# Patient Record
Sex: FEMALE | Race: WHITE | NOT HISPANIC OR LATINO | Employment: OTHER | URBAN - METROPOLITAN AREA
[De-identification: names, ages, dates, MRNs, and addresses within clinical notes are randomized per-mention and may not be internally consistent; named-entity substitution may affect disease eponyms.]

---

## 2023-01-09 ENCOUNTER — TRANSFERRED RECORDS (OUTPATIENT)
Dept: MULTI SPECIALTY CLINIC | Facility: CLINIC | Age: 83
End: 2023-01-09

## 2023-01-09 LAB
CREATININE (EXTERNAL): 0.75 MG/DL (ref 0.55–1.02)
GFR ESTIMATED (EXTERNAL): >60 ML/MIN/1.73M2
GLUCOSE (EXTERNAL): 88 MG/DL (ref 70–99)
POTASSIUM (EXTERNAL): 4.4 MMOL/L (ref 3.5–5.1)

## 2023-01-09 RX ORDER — GEMFIBROZIL 600 MG/1
600 TABLET, FILM COATED ORAL
COMMUNITY

## 2023-01-09 RX ORDER — DOCUSATE SODIUM 100 MG/1
100 CAPSULE, LIQUID FILLED ORAL 2 TIMES DAILY
COMMUNITY

## 2023-01-09 RX ORDER — ALBUTEROL SULFATE 90 UG/1
2 AEROSOL, METERED RESPIRATORY (INHALATION) EVERY 6 HOURS PRN
COMMUNITY

## 2023-01-09 RX ORDER — NITROGLYCERIN 0.4 MG/1
0.4 TABLET SUBLINGUAL EVERY 5 MIN PRN
COMMUNITY

## 2023-01-09 RX ORDER — SPIRONOLACTONE 25 MG/1
25 TABLET ORAL EVERY MORNING
COMMUNITY

## 2023-01-09 RX ORDER — CHLORAL HYDRATE 500 MG
2 CAPSULE ORAL EVERY EVENING
COMMUNITY

## 2023-01-09 RX ORDER — CHOLECALCIFEROL (VITAMIN D3) 50 MCG
1 TABLET ORAL EVERY MORNING
COMMUNITY

## 2023-01-09 RX ORDER — ATORVASTATIN CALCIUM 40 MG/1
40 TABLET, FILM COATED ORAL EVERY EVENING
COMMUNITY

## 2023-01-09 RX ORDER — LOSARTAN POTASSIUM 25 MG/1
25 TABLET ORAL EVERY EVENING
COMMUNITY

## 2023-01-09 RX ORDER — SODIUM PHOSPHATE,MONO-DIBASIC 19G-7G/118
1 ENEMA (ML) RECTAL EVERY MORNING
COMMUNITY

## 2023-01-09 RX ORDER — LANOLIN ALCOHOL/MO/W.PET/CERES
1000 CREAM (GRAM) TOPICAL EVERY MORNING
COMMUNITY

## 2023-01-09 RX ORDER — FLUTICASONE PROPIONATE 110 UG/1
1 AEROSOL, METERED RESPIRATORY (INHALATION) PRN
COMMUNITY

## 2023-01-09 RX ORDER — MULTIVIT-MIN/IRON/FOLIC ACID/K 18-600-40
500 CAPSULE ORAL EVERY EVENING
COMMUNITY

## 2023-01-09 RX ORDER — METOPROLOL TARTRATE 50 MG
50 TABLET ORAL EVERY EVENING
COMMUNITY

## 2023-01-09 RX ORDER — EZETIMIBE 10 MG/1
10 TABLET ORAL EVERY EVENING
COMMUNITY

## 2023-01-09 RX ORDER — LEVOTHYROXINE SODIUM 125 UG/1
125 TABLET ORAL EVERY MORNING
COMMUNITY

## 2023-01-22 ENCOUNTER — ANESTHESIA EVENT (OUTPATIENT)
Dept: SURGERY | Facility: CLINIC | Age: 83
End: 2023-01-22
Payer: COMMERCIAL

## 2023-01-23 ENCOUNTER — ANESTHESIA (OUTPATIENT)
Dept: SURGERY | Facility: CLINIC | Age: 83
End: 2023-01-23
Payer: COMMERCIAL

## 2023-01-23 ENCOUNTER — HOSPITAL ENCOUNTER (OUTPATIENT)
Facility: CLINIC | Age: 83
Discharge: HOME | End: 2023-01-23
Attending: INTERNAL MEDICINE | Admitting: INTERNAL MEDICINE
Payer: COMMERCIAL

## 2023-01-23 VITALS
WEIGHT: 187 LBS | OXYGEN SATURATION: 99 % | RESPIRATION RATE: 12 BRPM | HEIGHT: 64 IN | DIASTOLIC BLOOD PRESSURE: 56 MMHG | BODY MASS INDEX: 31.92 KG/M2 | TEMPERATURE: 96.8 F | SYSTOLIC BLOOD PRESSURE: 100 MMHG | HEART RATE: 66 BPM

## 2023-01-23 LAB — COLONOSCOPY: NORMAL

## 2023-01-23 PROCEDURE — 360N000075 HC SURGERY LEVEL 2, PER MIN: Performed by: INTERNAL MEDICINE

## 2023-01-23 PROCEDURE — 250N000009 HC RX 250: Performed by: ANESTHESIOLOGY

## 2023-01-23 PROCEDURE — 272N000001 HC OR GENERAL SUPPLY STERILE: Performed by: INTERNAL MEDICINE

## 2023-01-23 PROCEDURE — 370N000017 HC ANESTHESIA TECHNICAL FEE, PER MIN: Performed by: INTERNAL MEDICINE

## 2023-01-23 PROCEDURE — 710N000012 HC RECOVERY PHASE 2, PER MINUTE: Performed by: INTERNAL MEDICINE

## 2023-01-23 PROCEDURE — 250N000011 HC RX IP 250 OP 636: Performed by: NURSE ANESTHETIST, CERTIFIED REGISTERED

## 2023-01-23 PROCEDURE — 999N000141 HC STATISTIC PRE-PROCEDURE NURSING ASSESSMENT: Performed by: INTERNAL MEDICINE

## 2023-01-23 PROCEDURE — 250N000011 HC RX IP 250 OP 636: Performed by: INTERNAL MEDICINE

## 2023-01-23 PROCEDURE — 258N000003 HC RX IP 258 OP 636: Performed by: ANESTHESIOLOGY

## 2023-01-23 PROCEDURE — 88305 TISSUE EXAM BY PATHOLOGIST: CPT | Mod: TC | Performed by: INTERNAL MEDICINE

## 2023-01-23 RX ORDER — NALOXONE HYDROCHLORIDE 0.4 MG/ML
0.2 INJECTION, SOLUTION INTRAMUSCULAR; INTRAVENOUS; SUBCUTANEOUS
Status: CANCELLED | OUTPATIENT
Start: 2023-01-23

## 2023-01-23 RX ORDER — PROPOFOL 10 MG/ML
INJECTION, EMULSION INTRAVENOUS CONTINUOUS PRN
Status: DISCONTINUED | OUTPATIENT
Start: 2023-01-23 | End: 2023-01-23

## 2023-01-23 RX ORDER — ONDANSETRON 2 MG/ML
4 INJECTION INTRAMUSCULAR; INTRAVENOUS EVERY 6 HOURS PRN
Status: CANCELLED | OUTPATIENT
Start: 2023-01-23

## 2023-01-23 RX ORDER — ONDANSETRON 2 MG/ML
4 INJECTION INTRAMUSCULAR; INTRAVENOUS
Status: COMPLETED | OUTPATIENT
Start: 2023-01-23 | End: 2023-01-23

## 2023-01-23 RX ORDER — PROCHLORPERAZINE MALEATE 5 MG
5 TABLET ORAL EVERY 6 HOURS PRN
Status: CANCELLED | OUTPATIENT
Start: 2023-01-23

## 2023-01-23 RX ORDER — NALOXONE HYDROCHLORIDE 0.4 MG/ML
0.4 INJECTION, SOLUTION INTRAMUSCULAR; INTRAVENOUS; SUBCUTANEOUS
Status: CANCELLED | OUTPATIENT
Start: 2023-01-23

## 2023-01-23 RX ORDER — LIDOCAINE 40 MG/G
CREAM TOPICAL
Status: DISCONTINUED | OUTPATIENT
Start: 2023-01-23 | End: 2023-01-23 | Stop reason: HOSPADM

## 2023-01-23 RX ORDER — SODIUM CHLORIDE, SODIUM LACTATE, POTASSIUM CHLORIDE, CALCIUM CHLORIDE 600; 310; 30; 20 MG/100ML; MG/100ML; MG/100ML; MG/100ML
INJECTION, SOLUTION INTRAVENOUS CONTINUOUS
Status: DISCONTINUED | OUTPATIENT
Start: 2023-01-23 | End: 2023-01-23 | Stop reason: HOSPADM

## 2023-01-23 RX ORDER — FLUMAZENIL 0.1 MG/ML
0.2 INJECTION, SOLUTION INTRAVENOUS
Status: CANCELLED | OUTPATIENT
Start: 2023-01-23 | End: 2023-01-23

## 2023-01-23 RX ORDER — ONDANSETRON 4 MG/1
4 TABLET, ORALLY DISINTEGRATING ORAL EVERY 6 HOURS PRN
Status: CANCELLED | OUTPATIENT
Start: 2023-01-23

## 2023-01-23 RX ADMIN — PROPOFOL 150 MCG/KG/MIN: 10 INJECTION, EMULSION INTRAVENOUS at 09:05

## 2023-01-23 RX ADMIN — ONDANSETRON 4 MG: 2 INJECTION INTRAMUSCULAR; INTRAVENOUS at 09:10

## 2023-01-23 RX ADMIN — PROPOFOL 85 MG: 10 INJECTION, EMULSION INTRAVENOUS at 09:13

## 2023-01-23 RX ADMIN — LIDOCAINE HYDROCHLORIDE 20 MG: 10 INJECTION, SOLUTION INFILTRATION; PERINEURAL at 09:01

## 2023-01-23 RX ADMIN — PROPOFOL 20 MG: 10 INJECTION, EMULSION INTRAVENOUS at 09:04

## 2023-01-23 RX ADMIN — SODIUM CHLORIDE, POTASSIUM CHLORIDE, SODIUM LACTATE AND CALCIUM CHLORIDE: 600; 310; 30; 20 INJECTION, SOLUTION INTRAVENOUS at 08:50

## 2023-01-23 ASSESSMENT — ACTIVITIES OF DAILY LIVING (ADL): ADLS_ACUITY_SCORE: 35

## 2023-01-23 NOTE — ANESTHESIA POSTPROCEDURE EVALUATION
Patient: Lindsay Ford    Procedure: Procedure(s):  COLONOSCOPY with polypectomies       Anesthesia Type:  MAC    Note:     Postop Pain Control: Uneventful            Sign Out: Well controlled pain   PONV: No   Neuro/Psych: Uneventful            Sign Out: Acceptable/Baseline neuro status   Airway/Respiratory: Uneventful            Sign Out: Acceptable/Baseline resp. status   CV/Hemodynamics: Uneventful            Sign Out: Acceptable CV status; No obvious hypovolemia; No obvious fluid overload   Other NRE: NONE   DID A NON-ROUTINE EVENT OCCUR? No           Last vitals:  Vitals Value Taken Time   /56 01/23/23 0957   Temp 36  C (96.8  F) 01/23/23 0957   Pulse 66 01/23/23 0934   Resp 12 01/23/23 0957   SpO2 99 % 01/23/23 0957       Electronically Signed By: Pacheco Mclean MD  January 23, 2023  11:16 AM

## 2023-01-23 NOTE — INTERVAL H&P NOTE
"I have reviewed the surgical (or preoperative) H&P that is linked to this encounter, and examined the patient. There are no significant changes    Clinical Conditions Present on Arrival:  Clinically Significant Risk Factors Present on Admission                    # Obesity: Estimated body mass index is 32.61 kg/m  as calculated from the following:    Height as of this encounter: 1.613 m (5' 3.5\").    Weight as of this encounter: 84.8 kg (187 lb).       "

## 2023-01-23 NOTE — ANESTHESIA CARE TRANSFER NOTE
Patient: Lindsay Ford    Procedure: Procedure(s):  COLONOSCOPY with polypectomies       Diagnosis: Screening for colon cancer [Z12.11]  Diagnosis Additional Information: No value filed.    Anesthesia Type:   MAC     Note:    Oropharynx: oropharynx clear of all foreign objects and spontaneously breathing  Level of Consciousness: awake  Oxygen Supplementation: room air    Independent Airway: airway patency satisfactory and stable    Vital Signs Stable: post-procedure vital signs reviewed and stable  Report to RN Given: handoff report given  Patient transferred to: Phase II    Handoff Report: Identifed the Patient, Identified the Reponsible Provider, Reviewed the pertinent medical history, Discussed the surgical course, Reviewed Intra-OP anesthesia mangement and issues during anesthesia, Set expectations for post-procedure period and Allowed opportunity for questions and acknowledgement of understanding      Vitals:  Vitals Value Taken Time   BP 93/56 01/23/23 0934   Temp 36  C (96.8  F) 01/23/23 0934   Pulse 66 01/23/23 0934   Resp 14 01/23/23 0934   SpO2 99 % 01/23/23 0934       Electronically Signed By: DEVON Duarte CRNA  January 23, 2023  9:38 AM

## 2023-01-23 NOTE — ANESTHESIA PREPROCEDURE EVALUATION
Anesthesia Pre-Procedure Evaluation    Patient: Lindsay Ford   MRN: [id number] : 1940        Procedure : Procedure(s):  COLONOSCOPY          Past Medical History:   Diagnosis Date     Complication of anesthesia      PONV (postoperative nausea and vomiting)       Past Surgical History:   Procedure Laterality Date     BRAIN SURGERY       HYSTERECTOMY       SINUS SURGERY        No Known Allergies   Social History     Tobacco Use     Smoking status: Former     Types: Cigarettes     Quit date:      Years since quittin.0     Smokeless tobacco: Never   Substance Use Topics     Alcohol use: Yes     Comment: Very rare      Wt Readings from Last 1 Encounters:   23 84.8 kg (187 lb)        Anesthesia Evaluation   Pt has had prior anesthetic.     History of anesthetic complications  - PONV.      ROS/MED HX  ENT/Pulmonary:  - neg pulmonary ROS     Neurologic: Comment: Hx of crani      Cardiovascular:  - neg cardiovascular ROS     METS/Exercise Tolerance:     Hematologic:  - neg hematologic  ROS     Musculoskeletal:       GI/Hepatic:  - neg GI/hepatic ROS     Renal/Genitourinary:  - neg Renal ROS     Endo:  - neg endo ROS     Psychiatric/Substance Use:       Infectious Disease:       Malignancy:       Other:            Physical Exam    Airway        Mallampati: II   TM distance: > 3 FB   Neck ROM: full   Mouth opening: > 3 cm    Respiratory Devices and Support         Dental       (+) Minor Abnormalities - some fillings, tiny chips      Cardiovascular   cardiovascular exam normal          Pulmonary   pulmonary exam normal                OUTSIDE LABS:  CBC: No results found for: WBC, HGB, HCT, PLT  BMP: No results found for: NA, POTASSIUM, CHLORIDE, CO2, BUN, CR, GLC  COAGS: No results found for: PTT, INR, FIBR  POC: No results found for: BGM, HCG, HCGS  HEPATIC: No results found for: ALBUMIN, PROTTOTAL, ALT, AST, GGT, ALKPHOS, BILITOTAL, BILIDIRECT, STACEY  OTHER: No results found for: PH, LACT, A1C, BEBA,  PHOS, MAG, LIPASE, AMYLASE, TSH, T4, T3, CRP, SED    Anesthesia Plan    ASA Status:  2   NPO Status:  NPO Appropriate    Anesthesia Type: MAC.              Consents    Anesthesia Plan(s) and associated risks, benefits, and realistic alternatives discussed. Questions answered and patient/representative(s) expressed understanding.     - Discussed: Risks, Benefits and Alternatives for the PROCEDURE were discussed     - Discussed with:  Patient         Postoperative Care    Pain management: IV analgesics, Oral pain medications.   PONV prophylaxis: Ondansetron (or other 5HT-3), Dexamethasone or Solumedrol     Comments:                Pacheco Mclean MD

## 2023-01-23 NOTE — CONSULTS
Pre-procedure Note    Reason for procedure: Adenoma surveillance    History and Physical Reviewed: Reviewed, no changes.    Pre-sedation assessment:    General: alert, appears stated age, and cooperative  Airway: normal  Heart: regular rate and rhythm  Lungs: clear to auscultation bilaterally    Sedation Plan based on assessment: MAC    Mallampati score: Class II (visualization of the soft palate, fauces, and uvula)          ASA Classification: ASA 2 - Patient with mild systemic disease with no functional limitations    Impression: Patient deemed adequate candidate for MAC sedation    Risks, benefits and alternatives were discussed with the patient and informed consent was obtained.    Plan: colonoscopy      Edward Vazquez MD 1/23/2023 8:56 AM                                               Edward Vazquez MD  Thank you for the opportunity to participate in the care of this patient.   Please feel free to call me with any questions or concerns.  Phone number [phone].

## 2023-01-24 LAB
PATH REPORT.COMMENTS IMP SPEC: NORMAL
PATH REPORT.COMMENTS IMP SPEC: NORMAL
PATH REPORT.FINAL DX SPEC: NORMAL
PATH REPORT.GROSS SPEC: NORMAL
PATH REPORT.MICROSCOPIC SPEC OTHER STN: NORMAL
PATH REPORT.RELEVANT HX SPEC: NORMAL
PHOTO IMAGE: NORMAL

## 2023-01-24 PROCEDURE — 88305 TISSUE EXAM BY PATHOLOGIST: CPT | Mod: 26 | Performed by: PATHOLOGY

## 2023-09-11 ENCOUNTER — TRANSCRIBE ORDERS (OUTPATIENT)
Dept: OTHER | Age: 83
End: 2023-09-11

## 2023-09-11 ENCOUNTER — PATIENT OUTREACH (OUTPATIENT)
Dept: ONCOLOGY | Facility: CLINIC | Age: 83
End: 2023-09-11
Payer: COMMERCIAL

## 2023-09-11 DIAGNOSIS — N83.202 BILATERAL OVARIAN CYSTS: Primary | ICD-10-CM

## 2023-09-11 DIAGNOSIS — N83.201 BILATERAL OVARIAN CYSTS: Primary | ICD-10-CM

## 2023-09-11 NOTE — PROGRESS NOTES
"New Patient Hematology / Oncology Nurse Navigator Note     Referral Date: 9/11/23    Referring provider:   Humaira Plascencia MD   [address] [place] [address]   [phone] (Work)   [phone] (Fax)     Referring Clinic/Organization: [facility]      Referred to: GynOnc    Requested provider (if applicable): First available - did not specify     Evaluation for :        Clinical History (per Nurse review of records provided):     Note from referring MD re: surgery:  \"After review of ultrasound again and speaking with patient, reviewed recommendation for GYN oncology to perform surgery given possibility for staging needed, even if low. A referral was placed to [facility], but they cannot accommodate her for many months, so referral to the [facility] GYN oncology clinic placed. She states she is concerned and desires sooner surgery. She is on my schedule for October 10th and we will leave this in place until we hear back from the [facility].    Reviewed the rationale to have GYN oncology do her surgery, so they can perform stating if possible. She states she would like surgery as soon as possible. She did call the [facility] and they needed her records and then will review and schedule appointment. \"-- BOOKMARKED  8/28/23 Pelvic US:  Impression:   Uterus surgically absent.   The right ovary contains several anechoic areas, with a new papillary   projection noted.  No color flow noted.   The left ovary now contains several anechoic areas with now three   papillary projections noted (two within the larger anechoic cyst).   These cysts have changed since her last ultrasound.   No free fluid noted.  -- BOOKMARKED  8/28 labs/tumor markers -- BOOKMARKED    Clinical Assessment / Barriers to Care (Per Nurse):  Pt lives in [place], willing to go to any location. Currently scheduled for surgery with OBGYN at  10/2 but they're hoping for her to meet with GynOnc for consult. Unable to accommodate at  in a " timely fashion.       Records Location: Care Everywhere     Records Needed:   Records from  per protocol. Please obtain op/path report from Hysterectomy. Unsure where/when this was done please contact patient to find out where she had surgery and obtain records.     Additional testing needed prior to consult:   N/A    Referral updates and Plan:   OUTGOING CALL to pt:  Introduced my role as nurse navigator [facility] and that we have recd the referral to GynOnc from OBGYN team at .   Pt confirms they are aware of the referral and ready to schedule. Discussed consult with Dr. Barrios at [facility] next week which pt is agreeable to.     Message to referring MD with update on referral/date of consult.   Provided contact information if future questions arise.     -Transferred pt to NPS line [phone] to schedule appt per scheduling instructions.    Rosita Blakely, BSN, RN, PHN, OCN  Hematology/Oncology Nurse Navigator  [facility]  [phone]

## 2023-09-13 NOTE — TELEPHONE ENCOUNTER
RECORDS STATUS - ALL OTHER DIAGNOSIS    Referring Provider/Location:  [facility]  Dx and Code: Bilateral ovarian cysts [N83.201, N83.202]  - Primary  Appt Date:  9.18.23  Provider: Denis    Records Requested     September 13, 2023 8:25 AM    TJ   Clinic name Comments/Action Taken Outcome   [facility] Faxed request for all images  Complete - resolved in PACS     RECORDS RECEIVED FROM: [facility]   DATE RECEIVED: CE   NOTES STATUS DETAILS   [facility]11.23  [facility]   OPERATIVE REPORT 1.23.23    MEDICATION LIST Care Everywhere    LABS     PATHOLOGY REPORTS None related    ANYTHING RELATED TO DIAGNOSIS 8.28.23 Labs   IMAGING (NEED IMAGES & REPORT)     MRI 6.27.23; 5.25.22 Brain   XRAYS 7.26.23 Dexa/Bone Density   ULTRASOUND 8.28.23; 6.23.23; 7.18.22; 7.21.21 Pelvic/Gyn

## 2023-09-18 ENCOUNTER — PRE VISIT (OUTPATIENT)
Dept: ONCOLOGY | Facility: HOSPITAL | Age: 83
End: 2023-09-18
Payer: COMMERCIAL

## 2023-09-18 ENCOUNTER — HOSPITAL ENCOUNTER (OUTPATIENT)
Dept: CT IMAGING | Facility: CLINIC | Age: 83
Discharge: HOME | End: 2023-09-18
Attending: OBSTETRICS & GYNECOLOGY | Admitting: OBSTETRICS & GYNECOLOGY
Payer: COMMERCIAL

## 2023-09-18 ENCOUNTER — ONCOLOGY VISIT (OUTPATIENT)
Dept: ONCOLOGY | Facility: HOSPITAL | Age: 83
End: 2023-09-18
Attending: OBSTETRICS & GYNECOLOGY
Payer: COMMERCIAL

## 2023-09-18 VITALS
BODY MASS INDEX: 33.6 KG/M2 | WEIGHT: 196.8 LBS | SYSTOLIC BLOOD PRESSURE: 141 MMHG | TEMPERATURE: 98 F | OXYGEN SATURATION: 96 % | RESPIRATION RATE: 16 BRPM | DIASTOLIC BLOOD PRESSURE: 75 MMHG | HEART RATE: 62 BPM | HEIGHT: 64 IN

## 2023-09-18 DIAGNOSIS — N83.8 OVARIAN MASS: Primary | ICD-10-CM

## 2023-09-18 DIAGNOSIS — N83.202 BILATERAL OVARIAN CYSTS: ICD-10-CM

## 2023-09-18 DIAGNOSIS — N83.8 OVARIAN MASS: ICD-10-CM

## 2023-09-18 DIAGNOSIS — N83.201 BILATERAL OVARIAN CYSTS: ICD-10-CM

## 2023-09-18 LAB
CREAT BLD-MCNC: 0.8 MG/DL (ref 0.6–1.1)
EGFRCR SERPLBLD CKD-EPI 2021: >60 ML/MIN/1.73M2

## 2023-09-18 PROCEDURE — 250N000011 HC RX IP 250 OP 636: Mod: JZ | Performed by: OBSTETRICS & GYNECOLOGY

## 2023-09-18 PROCEDURE — 93010 ELECTROCARDIOGRAM REPORT: CPT | Performed by: OBSTETRICS & GYNECOLOGY

## 2023-09-18 PROCEDURE — 82565 ASSAY OF CREATININE: CPT

## 2023-09-18 PROCEDURE — 74177 CT ABD & PELVIS W/CONTRAST: CPT

## 2023-09-18 PROCEDURE — 99205 OFFICE O/P NEW HI 60 MIN: CPT | Mod: 57 | Performed by: OBSTETRICS & GYNECOLOGY

## 2023-09-18 PROCEDURE — G0463 HOSPITAL OUTPT CLINIC VISIT: HCPCS | Performed by: OBSTETRICS & GYNECOLOGY

## 2023-09-18 RX ORDER — PHENAZOPYRIDINE HYDROCHLORIDE 100 MG/1
200 TABLET, FILM COATED ORAL ONCE
Status: CANCELLED | OUTPATIENT
Start: 2023-09-18 | End: 2023-09-18

## 2023-09-18 RX ORDER — METRONIDAZOLE 500 MG/100ML
500 INJECTION, SOLUTION INTRAVENOUS
Status: CANCELLED | OUTPATIENT
Start: 2023-09-18

## 2023-09-18 RX ORDER — CEFAZOLIN SODIUM 2 G/100ML
2 INJECTION, SOLUTION INTRAVENOUS SEE ADMIN INSTRUCTIONS
Status: CANCELLED | OUTPATIENT
Start: 2023-09-18

## 2023-09-18 RX ORDER — CEFAZOLIN SODIUM 2 G/100ML
2 INJECTION, SOLUTION INTRAVENOUS
Status: CANCELLED | OUTPATIENT
Start: 2023-09-18

## 2023-09-18 RX ORDER — IOPAMIDOL 755 MG/ML
100 INJECTION, SOLUTION INTRAVASCULAR ONCE
Status: COMPLETED | OUTPATIENT
Start: 2023-09-18 | End: 2023-09-18

## 2023-09-18 RX ORDER — HEPARIN SODIUM 5000 [USP'U]/.5ML
5000 INJECTION, SOLUTION INTRAVENOUS; SUBCUTANEOUS
Status: CANCELLED | OUTPATIENT
Start: 2023-09-18

## 2023-09-18 RX ORDER — ASPIRIN 81 MG/1
162 TABLET ORAL EVERY EVENING
COMMUNITY

## 2023-09-18 RX ADMIN — IOPAMIDOL 100 ML: 755 INJECTION, SOLUTION INTRAVENOUS at 13:35

## 2023-09-18 ASSESSMENT — PAIN SCALES - GENERAL: PAINLEVEL: NO PAIN (0)

## 2023-09-18 NOTE — PROGRESS NOTES
Consult Notes on Referred Patient    Date: 2023       Dr. Humaira Plascencia, MD  [address] [place] [address]       RE: Lindsay Ford  : 1940  PABLO: 2023    Dear Dr. Humaira Plascencia:    I had the pleasure of seeing your patient Lindsay Ford here at the Gynecologic Cancer Clinic at the [facility] on 2023.  As you know she is a very pleasant 82 year old woman with a recent diagnosis of complex bilateral ovarian masses.  Given these findings she was subsequently sent to the Gynecologic Cancer Clinic for new patient consultation.  G3, P3 with 3 prior vaginal deliveries had a hysterectomy at age 34 for abnormal Pap smear.  She is always what it was at that time.  Has never been on hormone replacement therapy.  In any vaginal bleeding or discharge since her hysterectomy.  She has noticed some recent weight gain of 5 pounds and has some bloating and fullness in the last 2 months.  She has been followed by her gynecologist since  with yearly ultrasounds.  In  she had a cyst on both ovaries are less than a centimeter.  Recently since about  she had a cyst that was followed on her left ovary that has increased to now 3.6 x 3.1 cm.  Of note her  was normal.  She other wise is eating and drinking well.  No nausea vomiting fever chills.  No change in her urinary or bowel habits.  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  This had initially put pressure on the optic nerves she has been doing well since with that and follow-up MRIs showed stability after surgery.  In addition the patient had a heart attack at the age of 17 with a stent placement.  She currently is on 2 baby aspirin for that.  She has been asymptomatic from a cardiac standpoint.      Past Medical History:    Past Medical History:   Diagnosis Date    Complication of anesthesia     PONV (postoperative nausea and vomiting)          Past  "Surgical History:    Past Surgical History:   Procedure Laterality Date    BRAIN SURGERY      COLONOSCOPY N/A 2023    Procedure: COLONOSCOPY with polypectomies;  Surgeon: Edward Vazquez MD;  Location: [facility] Main OR    HYSTERECTOMY      SINUS SURGERY           Health Maintenance:  Health Maintenance Due   Topic Date Due    DEXA  Never done    TSH W/FREE T4 REFLEX  Never done    ADVANCE CARE PLANNING  Never done    MEDICARE ANNUAL WELLNESS VISIT  2011    PHQ-2 (once per calendar year)  Never done    COVID-19 Vaccine (6 - Moderna series) 2023    INFLUENZA VACCINE (1) 2023    DTAP/TDAP/TD IMMUNIZATION (2 - Td or Tdap) 10/01/2023       No history of abnormal Pap smears after hysterectomy.  Last colonoscopy was last year with 2 benign polyps and she was advised not to have any more colonoscopies.      Current Medications:     has a current medication list which includes the following prescription(s): albuterol, vitamin c, aspirin, atorvastatin, cyanocobalamin, docusate sodium, ezetimibe, fish oil-omega-3 fatty acids, fluticasone, gemfibrozil, glucosamine-chondroitin, levothyroxine, losartan, metoprolol tartrate, nitroglycerin, spironolactone, and vitamin d3.       Allergies:     Patient has no known allergies.        Social History:     Social History     Tobacco Use    Smoking status: Former     Types: Cigarettes     Quit date:      Years since quittin.7    Smokeless tobacco: Never   Substance Use Topics    Alcohol use: Yes     Comment: Very rare       History   Drug Use Unknown           Family History:   No family history of cancer.      Physical Exam:     BP (!) 141/75   Pulse 62   Temp 98  F (36.7  C)   Resp 16   Ht 1.613 m (5' 3.5\")   Wt 89.3 kg (196 lb 12.8 oz)   SpO2 96%   BMI 34.31 kg/m    Body mass index is 34.31 kg/m .    General Appearance: healthy and alert, no distress     Musculoskeletal: extremities non tender and without edema    Skin: no lesions or rashes "     Neurological: normal gait, no gross defects     Psychiatric: appropriate mood and affect                               Hematological: normal cervical, supraclavicular and inguinal lymph nodes     Gastrointestinal:       abdomen soft, non-tender, non-distended, no organomegaly or masses    Genitourinary: External genitalia and urethral meatus appears normal.  Vagina is smooth without nodularity or masses.  Well-healed vaginal cuff intact.  Bimanual exam reveal no masses, nodularity or fullness.  Recto-vaginal exam confirms these findings.      Assessment:    Lindsay Ford is a 82 year old woman with a new diagnosis of bilateral ovarian masses.     A total of 60 minutes was spent with the patient, 40 minutes of which were spent in counseling the patient and/or treatment planning.      Increasing bilateral ovarian masses   of 12  History of MI and cardiac stent   ECOG 0    We discussed that I recommend to remove both tubes and ovaries laparoscopically given her increase in size and now some symptoms with force of bloating.  We will obtain a CT scan of the abdomen and pelvis preoperatively to rule out any evidence of additional disease.  She will see my colleagues in anesthesia to get cleared for surgery especially given her prior heart attack and stent placement.  We did discuss if we do require a cancer we will proceed with an open procedure and either cancer staging or even possible cytoreduction.  The patient agrees with this plan revision of her care all questions were answered.    Risks, benefits and alternatives to proceed discussed in detail with the patient. Risks include but are not limited to bleeding, infection, possible injury to surrounding organs including bowel, bladder, ureter, need for second procedure/surgery related to complications from first procedure, postoperative medical complications such as cardiopulmonary events, lymphedema, lymphocyst, thromboembolic events.         Thank you for  allowing us to participate in the care of your patient.         Sincerely,    Everardo Barrios MD, MS    Department of Obstetrics and Gynecology   Division of Gynecologic Oncology   [facility]  Phone: [phone]    CC  Patient Care Team:  Kathe Jo as PCP - General (Internal Medicine)  Humaira Wood MD as MD (OB/Gyn)  Everardo Barrios MD as MD (Gynecologic Oncology)  HUMAIRA WOOD

## 2023-09-18 NOTE — LETTER
2023         RE: Lindsay Ford  [address] [address]        Dear Colleague,    Thank you for referring your patient, Lindsay Ford, to the [facility]. Please see a copy of my visit note below.                            Consult Notes on Referred Patient    Date: 2023       Dr. Humaira Plascencia MD  [address] [place] [address]       RE: Lindsay Ford  : 1940  PABLO: 2023    Dear Dr. Humaira Plascencia:    I had the pleasure of seeing your patient Lindsay Ford here at the Gynecologic Cancer Clinic at the [facility] on 2023.  As you know she is a very pleasant 82 year old woman with a recent diagnosis of complex bilateral ovarian masses.  Given these findings she was subsequently sent to the Gynecologic Cancer Clinic for new patient consultation.  G3, P3 with 3 prior vaginal deliveries had a hysterectomy at age 34 for abnormal Pap smear.  She is always what it was at that time.  Has never been on hormone replacement therapy.  In any vaginal bleeding or discharge since her hysterectomy.  She has noticed some recent weight gain of 5 pounds and has some bloating and fullness in the last 2 months.  She has been followed by her gynecologist since  with yearly ultrasounds.  In  she had a cyst on both ovaries are less than a centimeter.  Recently since about  she had a cyst that was followed on her left ovary that has increased to now 3.6 x 3.1 cm.  Of note her  was normal.  She other wise is eating and drinking well.  No nausea vomiting fever chills.  No change in her urinary or bowel habits.  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  This had initially put pressure on the optic nerves she has been doing well since with that and follow-up MRIs showed stability after surgery.  In addition the patient had a heart attack at the age of 17 with a stent placement.  She  currently is on 2 baby aspirin for that.  She has been asymptomatic from a cardiac standpoint.      Past Medical History:    Past Medical History:   Diagnosis Date     Complication of anesthesia      PONV (postoperative nausea and vomiting)          Past Surgical History:    Past Surgical History:   Procedure Laterality Date     BRAIN SURGERY       COLONOSCOPY N/A 2023    Procedure: COLONOSCOPY with polypectomies;  Surgeon: Edward Vazquez MD;  Location: [facility] Main OR     HYSTERECTOMY       SINUS SURGERY           Health Maintenance:  Health Maintenance Due   Topic Date Due     DEXA  Never done     TSH W/FREE T4 REFLEX  Never done     ADVANCE CARE PLANNING  Never done     MEDICARE ANNUAL WELLNESS VISIT  2011     PHQ-2 (once per calendar year)  Never done     COVID-19 Vaccine (6 - Moderna series) 2023     INFLUENZA VACCINE (1) 2023     DTAP/TDAP/TD IMMUNIZATION (2 - Td or Tdap) 10/01/2023       No history of abnormal Pap smears after hysterectomy.  Last colonoscopy was last year with 2 benign polyps and she was advised not to have any more colonoscopies.      Current Medications:     has a current medication list which includes the following prescription(s): albuterol, vitamin c, aspirin, atorvastatin, cyanocobalamin, docusate sodium, ezetimibe, fish oil-omega-3 fatty acids, fluticasone, gemfibrozil, glucosamine-chondroitin, levothyroxine, losartan, metoprolol tartrate, nitroglycerin, spironolactone, and vitamin d3.       Allergies:     Patient has no known allergies.        Social History:     Social History     Tobacco Use     Smoking status: Former     Types: Cigarettes     Quit date: 1981     Years since quittin.7     Smokeless tobacco: Never   Substance Use Topics     Alcohol use: Yes     Comment: Very rare       History   Drug Use Unknown           Family History:   No family history of cancer.      Physical Exam:     BP (!) 141/75   Pulse 62   Temp 98  F (36.7  C)   Resp  "16   Ht 1.613 m (5' 3.5\")   Wt 89.3 kg (196 lb 12.8 oz)   SpO2 96%   BMI 34.31 kg/m    Body mass index is 34.31 kg/m .    General Appearance: healthy and alert, no distress     Musculoskeletal: extremities non tender and without edema    Skin: no lesions or rashes     Neurological: normal gait, no gross defects     Psychiatric: appropriate mood and affect                               Hematological: normal cervical, supraclavicular and inguinal lymph nodes     Gastrointestinal:       abdomen soft, non-tender, non-distended, no organomegaly or masses    Genitourinary: External genitalia and urethral meatus appears normal.  Vagina is smooth without nodularity or masses.  Well-healed vaginal cuff intact.  Bimanual exam reveal no masses, nodularity or fullness.  Recto-vaginal exam confirms these findings.      Assessment:    Lindsay Ford is a 82 year old woman with a new diagnosis of bilateral ovarian masses.     A total of 60 minutes was spent with the patient, 40 minutes of which were spent in counseling the patient and/or treatment planning.      Increasing bilateral ovarian masses   of 12  History of MI and cardiac stent   ECOG 0    We discussed that I recommend to remove both tubes and ovaries laparoscopically given her increase in size and now some symptoms with force of bloating.  We will obtain a CT scan of the abdomen and pelvis preoperatively to rule out any evidence of additional disease.  She will see my colleagues in anesthesia to get cleared for surgery especially given her prior heart attack and stent placement.  We did discuss if we do require a cancer we will proceed with an open procedure and either cancer staging or even possible cytoreduction.  The patient agrees with this plan revision of her care all questions were answered.    Risks, benefits and alternatives to proceed discussed in detail with the patient. Risks include but are not limited to bleeding, infection, possible injury to " surrounding organs including bowel, bladder, ureter, need for second procedure/surgery related to complications from first procedure, postoperative medical complications such as cardiopulmonary events, lymphedema, lymphocyst, thromboembolic events.         Thank you for allowing us to participate in the care of your patient.         Sincerely,    Everardo Barrios MD, MS    Department of Obstetrics and Gynecology   Division of Gynecologic Oncology   [facility]  Phone: [phone]    CC  Patient Care Team:  Kathe Jo as PCP - General (Internal Medicine)  Humaira Wood MD as MD (OB/Gyn)  Everardo Barrios MD as MD (Gynecologic Oncology)  HUMAIRA WOOD        Again, thank you for allowing me to participate in the care of your patient.        Sincerely,        Everardo Barrios MD

## 2023-09-18 NOTE — NURSING NOTE
"Oncology Rooming Note    September 18, 2023 9:25 AM   Lindsay Ford is a 82 year old female who presents for:    Chief Complaint   Patient presents with    Oncology Clinic Visit     Gyn Onc consult     Initial Vitals: BP (!) 141/75   Pulse 62   Temp 98  F (36.7  C)   Resp 16   Ht 1.613 m (5' 3.5\")   Wt 89.3 kg (196 lb 12.8 oz)   SpO2 96%   BMI 34.31 kg/m   Estimated body mass index is 34.31 kg/m  as calculated from the following:    Height as of this encounter: 1.613 m (5' 3.5\").    Weight as of this encounter: 89.3 kg (196 lb 12.8 oz). Body surface area is 2 meters squared.  No Pain (0) Comment: Data Unavailable   No LMP recorded. Patient has had a hysterectomy.  Allergies reviewed: Yes  Medications reviewed: Yes    Medications: Medication refills not needed today.  Pharmacy name entered into [employer]!: [facility] #[id number] [place] [address] MAGGIE SERRATO AT [facility]    Clinical concerns: Pt is a retired nurse, she is here to discuss bilateral oopherectomy with Dr. Barrios. She had a hysterectomy (sparing falopian tubes and ovaries) when she was 34 years old. She's had annual pelvic Uss the last several years and they're seeing changes in ovaries. CEA and  have been done and are WNL. She has a c/o 4# weight gain and increase in gas the last few weeks. She's hoping for oopherectomy asap.   Dr. Barrios was NOT notified.      Jamilah García RN              "

## 2023-09-18 NOTE — PATIENT INSTRUCTIONS
Preparation for Surgery:  You will be scheduled for surgery. My  will reach out at her earliest convenience   We will schedule a preoperative visit with PAC.       NO bowel prep needed     DAY OF SURGERY:  Diet:  NO food or Milk products 8 hours prior to ARRIVAL TIME. You can have WATER up until 2 hours prior to ARRIVAL TIME PAC will give you specifics       Anticipate: .  You will be in the recovery room for approximately 2-4hrs after surgery.        At discharge you will need a someone to drive you home.    Postoperative Restrictions:    No heavy lifting >15 lbs or strenuous activity for six weeks  Nothing in the vagina (no tampons, no intercourse, no douching) for eight weeks.     Postoperative plan  Decreased appetite is normal, plan to eat 6-8 small meal day, drink at least 6-8 glasses of water per day, there are no dietary restrictions. However, it is recommended that you keep your diet light, simple and small. NOTHING: greasy, spicy, things you know give you gas and carbonation until you are passing gas regularly.     Take stool softener daily as directed  for at least 1-2 weeks unless you develop diarrhea.    Activity as tolerated, reminder to balance rest with activity as you will tire easily while recovering. Using stairs is ok. Walk as much as tolerated, increasing your activity every day.     You may shower 48 hours after surgery, your incision site can get wet/soapy, pat dry.      You will be given ibuprofen and tylenol, take on schedule every 6 hours.  Do not set an alarm to wake yourself up to take the pain medications.  Take consistently for a week  then you can decrease/stop as tolerated.  You will also be given a small dose of narcotics, you may take this in addition to the tylenol/ibuprofen as needed if the pain is not manageable.  do not take this on a schedule.      Ok to resume driving after surgery after you STOP using narcotics AND FEEL SAFE to handle a vehicle     Wound care:  No  need to keep your incision covered. However, you may cover your incision sites if there is drainage or clothing is causing irritation otherwise leave open to the air.  No need to put an creams/ointments on incision site.  Wound will be mildly pink and might have a firm ridge underneath this is normal and will resolve in 4-6 weeks    OTHER: Patients are often constipated after general anesthesia and surgery. The patient should continue to take stool softeners (for example, Senokot-S) for the next six weeks and consume adequate amounts of water. If the patient remains constipated or unable to pass stool, please try one or all of the following measures:    1. Milk of Magnesia 30cc twice a day as needed by mouth  2. Metamucil 2 tablespoons in 12 ounces of fluid  3. Dulcolax oral or suppositories  4. Prunes or prune juice  5. Miralax daily    Call clinic if you have fever greater than 100.5, heavy vaginal bleeding, persistent nausea/vomiting, increasing redness, pain, swelling at incision site, drainage with bad odor or other concerns.      You should be feeling better every day.     PLEASE BE AWARE THAT SOME OF THESE INSTRUCTIONS MAY CHANGE DEPENDING ON THE OUTCOME OF YOUR SURGERY    In[place]

## 2023-09-20 ENCOUNTER — TELEPHONE (OUTPATIENT)
Dept: ONCOLOGY | Facility: CLINIC | Age: 83
End: 2023-09-20
Payer: COMMERCIAL

## 2023-09-20 NOTE — TELEPHONE ENCOUNTER
Patient is schedule for surgery with: Dr. Barrios     Surgery Date: 10/10/23     Location: [facility]    H&P: to be completed by PAC clinic on 9/28/23     Post-op:  11/1/23, in person visit    Patient will receive surgery arrival and start time from PAC.    Patient aware times are subject to change up until day before surgery.     Patient questions/concerns: N/A     Surgery packet was sent via [employer] on 9/20/23      Sara Estrada on 9/20/2023 at 9:55 AM

## 2023-09-20 NOTE — TELEPHONE ENCOUNTER
FUTURE VISIT INFORMATION      SURGERY INFORMATION:  Date: 10/10/23  Location: uu or  Surgeon:  Everardo Barrios MD   Anesthesia Type:  general  Procedure: Laparoscopic Salpingo-Oophorectomy, Dissect Lymph Node possible open, possible cancer staging, possible debulking   Consult: ov 23    RECORDS REQUESTED FROM:       Primary Care Provider: Luna Stokes MD  - [facility]    Most recent EKG+ Tracin23    Most recent ECHO: 22- [facility]    Most recent PFT's: 23- [facility]    Most recent Sleep Study:   23- [facility]

## 2023-09-27 LAB
ABO/RH(D): NORMAL
ANTIBODY SCREEN: NEGATIVE
SPECIMEN EXPIRATION DATE: NORMAL

## 2023-09-28 ENCOUNTER — OFFICE VISIT (OUTPATIENT)
Dept: SURGERY | Facility: CLINIC | Age: 83
End: 2023-09-28
Payer: COMMERCIAL

## 2023-09-28 ENCOUNTER — LAB (OUTPATIENT)
Dept: LAB | Facility: CLINIC | Age: 83
End: 2023-09-28
Payer: COMMERCIAL

## 2023-09-28 ENCOUNTER — ANCILLARY PROCEDURE (OUTPATIENT)
Dept: ULTRASOUND IMAGING | Facility: CLINIC | Age: 83
End: 2023-09-28
Attending: NURSE PRACTITIONER
Payer: COMMERCIAL

## 2023-09-28 ENCOUNTER — ANESTHESIA EVENT (OUTPATIENT)
Dept: SURGERY | Facility: CLINIC | Age: 83
End: 2023-09-28
Payer: COMMERCIAL

## 2023-09-28 ENCOUNTER — PRE VISIT (OUTPATIENT)
Dept: SURGERY | Facility: CLINIC | Age: 83
End: 2023-09-28

## 2023-09-28 VITALS
SYSTOLIC BLOOD PRESSURE: 112 MMHG | HEIGHT: 64 IN | DIASTOLIC BLOOD PRESSURE: 71 MMHG | OXYGEN SATURATION: 95 % | RESPIRATION RATE: 16 BRPM | HEART RATE: 65 BPM | WEIGHT: 197.4 LBS | BODY MASS INDEX: 33.7 KG/M2 | TEMPERATURE: 98 F

## 2023-09-28 DIAGNOSIS — N83.202 BILATERAL OVARIAN CYSTS: ICD-10-CM

## 2023-09-28 DIAGNOSIS — Z01.818 PREOP EXAMINATION: Primary | ICD-10-CM

## 2023-09-28 DIAGNOSIS — N83.201 BILATERAL OVARIAN CYSTS: ICD-10-CM

## 2023-09-28 DIAGNOSIS — R60.0 BILATERAL LOWER EXTREMITY EDEMA: ICD-10-CM

## 2023-09-28 DIAGNOSIS — R01.1 SYSTOLIC MURMUR: ICD-10-CM

## 2023-09-28 LAB
ANION GAP SERPL CALCULATED.3IONS-SCNC: 10 MMOL/L (ref 7–15)
BUN SERPL-MCNC: 15.3 MG/DL (ref 8–23)
CALCIUM SERPL-MCNC: 9.9 MG/DL (ref 8.8–10.2)
CHLORIDE SERPL-SCNC: 107 MMOL/L (ref 98–107)
CREAT SERPL-MCNC: 0.77 MG/DL (ref 0.51–0.95)
EGFRCR SERPLBLD CKD-EPI 2021: 77 ML/MIN/1.73M2
ERYTHROCYTE [DISTWIDTH] IN BLOOD BY AUTOMATED COUNT: 12.4 % (ref 10–15)
GLUCOSE SERPL-MCNC: 97 MG/DL (ref 70–99)
HCO3 SERPL-SCNC: 26 MMOL/L (ref 22–29)
HCT VFR BLD AUTO: 39 % (ref 35–47)
HGB BLD-MCNC: 12.9 G/DL (ref 11.7–15.7)
MCH RBC QN AUTO: 30.4 PG (ref 26.5–33)
MCHC RBC AUTO-ENTMCNC: 33.1 G/DL (ref 31.5–36.5)
MCV RBC AUTO: 92 FL (ref 78–100)
PLATELET # BLD AUTO: 248 10E3/UL (ref 150–450)
POTASSIUM SERPL-SCNC: 4.6 MMOL/L (ref 3.4–5.3)
RBC # BLD AUTO: 4.24 10E6/UL (ref 3.8–5.2)
SODIUM SERPL-SCNC: 143 MMOL/L (ref 135–145)
WBC # BLD AUTO: 6.6 10E3/UL (ref 4–11)

## 2023-09-28 PROCEDURE — 86850 RBC ANTIBODY SCREEN: CPT | Performed by: NURSE PRACTITIONER

## 2023-09-28 PROCEDURE — 36415 COLL VENOUS BLD VENIPUNCTURE: CPT | Performed by: PATHOLOGY

## 2023-09-28 PROCEDURE — 85027 COMPLETE CBC AUTOMATED: CPT | Performed by: PATHOLOGY

## 2023-09-28 PROCEDURE — 99205 OFFICE O/P NEW HI 60 MIN: CPT | Performed by: NURSE PRACTITIONER

## 2023-09-28 PROCEDURE — 93970 EXTREMITY STUDY: CPT | Mod: GC | Performed by: STUDENT IN AN ORGANIZED HEALTH CARE EDUCATION/TRAINING PROGRAM

## 2023-09-28 PROCEDURE — 80048 BASIC METABOLIC PNL TOTAL CA: CPT | Performed by: PATHOLOGY

## 2023-09-28 PROCEDURE — 86901 BLOOD TYPING SEROLOGIC RH(D): CPT | Performed by: NURSE PRACTITIONER

## 2023-09-28 RX ORDER — MULTIPLE VITAMINS W/ MINERALS TAB 9MG-400MCG
1 TAB ORAL EVERY EVENING
COMMUNITY

## 2023-09-28 RX ORDER — SENNOSIDES 8.6 MG
1 TABLET ORAL EVERY EVENING
COMMUNITY
End: 2023-11-01

## 2023-09-28 ASSESSMENT — ENCOUNTER SYMPTOMS: SEIZURES: 0

## 2023-09-28 ASSESSMENT — LIFESTYLE VARIABLES: TOBACCO_USE: 1

## 2023-09-28 ASSESSMENT — PAIN SCALES - GENERAL: PAINLEVEL: NO PAIN (0)

## 2023-09-28 ASSESSMENT — COPD QUESTIONNAIRES
COPD: 1
CAT_SEVERITY: MILD

## 2023-09-28 NOTE — H&P
Pre-Operative H & P     CC:  Preoperative exam to assess for increased cardiopulmonary risk while undergoing surgery and anesthesia.    Date of Encounter: 9/28/2023  Primary Care Physician:  Kathe Jo     Reason for visit:   Encounter Diagnoses   Name Primary?    Preop examination Yes    Bilateral ovarian cysts     Systolic murmur     Bilateral lower extremity edema        MARZENA Ford is a 82 year old female who presents for pre-operative H & P in preparation for  Procedure Information       Case: [id number] Date/Time: 10/10/23 0800    Procedures:       Laparoscopic Salpingo-Oophorectomy, Dissect Lymph Node (Abdomen)      possible open, possible cancer staging, possible debulking (Abdomen)    Anesthesia type: General    Diagnosis: Bilateral ovarian cysts [N83.201, N83.202]    Pre-op diagnosis: Bilateral ovarian cysts [N83.201, N83.202]    Location:  OR  /  OR    Providers: Everardo Barrios MD            The patient was seen by Dr. Barrios  at the Gynecologic Cancer Clinic at the [facility] on 9/18/2023 for further evaluation of recent diagnosis of complex bilateral ovarian masses.  The patient was counseled of the findings and treatment options by the surgical team. The patient has now been scheduled for the procedure as listed above.      The patient presents to the PAC in person today in preparation for the above scheduled procedure with comorbid conditions including CAD s/p PTCA of RCA (2015), HTN, HLD, COPD, hypothyroid, PONV, diverticulosis and monoclonal pararproteinemia.    History is obtained from the patient and chart review    Hx of abnormal bleeding or anti-platelet use: ASA    Menstrual history: No LMP recorded. Patient has had a hysterectomy.:      Past Medical History  Past Medical History:   Diagnosis Date    Complication of anesthesia     PONV (postoperative nausea and vomiting)        Past Surgical History  Past Surgical History:   Procedure Laterality Date     BRAIN SURGERY      COLONOSCOPY N/A 1/23/2023    Procedure: COLONOSCOPY with polypectomies;  Surgeon: Edward Vazquez MD;  Location: [facility]    HYSTERECTOMY      SINUS SURGERY         Prior to Admission Medications  Current Outpatient Medications   Medication Sig Dispense Refill    albuterol (PROAIR HFA/PROVENTIL HFA/VENTOLIN HFA) 108 (90 Base) MCG/ACT inhaler Inhale 2 puffs into the lungs every 6 hours as needed for shortness of breath, wheezing or cough      Ascorbic Acid (VITAMIN C) 500 MG CAPS Take 500 mg by mouth every evening      aspirin 81 MG EC tablet Take 162 mg by mouth every evening      atorvastatin (LIPITOR) 40 MG tablet Take 40 mg by mouth every evening      cyanocobalamin (VITAMIN B-12) 1000 MCG tablet Take 1,000 mcg by mouth every morning      docusate sodium (COLACE) 100 MG capsule Take 100 mg by mouth 2 times daily      ezetimibe (ZETIA) 10 MG tablet Take 10 mg by mouth every evening      fish oil-omega-3 fatty acids 1000 MG capsule Take 2 g by mouth every evening      fluticasone (FLOVENT HFA) 110 MCG/ACT inhaler Inhale 1 puff into the lungs as needed      gemfibrozil (LOPID) 600 MG tablet Take 600 mg by mouth 2 times daily (before meals)      glucosamine-chondroitin 500-400 MG CAPS per capsule Take 1 capsule by mouth every morning      levothyroxine (SYNTHROID/LEVOTHROID) 125 MCG tablet Take 125 mcg by mouth every morning      losartan (COZAAR) 25 MG tablet Take 25 mg by mouth every evening      methylcellulose (CITRUCEL) 500 MG TABS tablet Take 2,000 mg by mouth every evening      metoprolol tartrate (LOPRESSOR) 50 MG tablet Take 50 mg by mouth every evening      multivitamin w/minerals (MULTI-VITAMIN) tablet Take 1 tablet by mouth every evening      nitroGLYcerin (NITROSTAT) 0.4 MG sublingual tablet Place 0.4 mg under the tongue every 5 minutes as needed for chest pain For chest pain place 1 tablet under the tongue every 5 minutes for 3 doses. If symptoms persist 5 minutes after  1st dose call 911.      sennosides (SENOKOT) 8.6 MG tablet Take 1 tablet by mouth every evening      spironolactone (ALDACTONE) 25 MG tablet Take 25 mg by mouth every morning      vitamin D3 (CHOLECALCIFEROL) 50 mcg (2000 units) tablet Take 1 tablet by mouth every morning         Allergies  No Known Allergies    Social History  Social History     Socioeconomic History    Marital status:      Spouse name: Not on file    Number of children: Not on file    Years of education: Not on file    Highest education level: Not on file   Occupational History    Not on file   Tobacco Use    Smoking status: Former     Types: Cigarettes     Quit date:      Years since quittin.7    Smokeless tobacco: Never   Vaping Use    Vaping Use: Never used   Substance and Sexual Activity    Alcohol use: Yes     Comment: Very rare    Drug use: Never    Sexual activity: Not on file   Other Topics Concern    Not on file   Social History Narrative    Not on file     Social Determinants of Health     Financial Resource Strain: Not on file   Food Insecurity: Not on file   Transportation Needs: Not on file   Physical Activity: Not on file   Stress: Not on file   Social Connections: Not on file   Interpersonal Safety: Not on file   Housing Stability: Not on file       Family History  No family history on file.    Review of Systems  The complete review of systems is negative other than noted in the HPI or here.   Anesthesia Evaluation   Pt has had prior anesthetic. Type: General and MAC.    History of anesthetic complications  - PONV.  PONV after Endoscopic transsphenoidal stealth guided resection of pituitary mass .  Did fine with sinus surgery ..    ROS/MED HX  ENT/Pulmonary: Comment: H/o sinus surgery    (+) sleep apnea,               tobacco use (Quit .  40 pack years.), Past use,       mild,  COPD (Well controlled. Triggers URI.),              Neurologic: Comment: Endoscopic transsphenoidal stealth guided resection of  "pituitary mass 2019    Sinus surgery 2022    (+)      migraines (rare occurrence.),                       (-) no seizures, no CVA and no TIA   Cardiovascular: Comment: Denies cardiac symptoms including chest pain, SOB, palpitations, syncope, TORRES, orthopnea, or PND.        (+) Dyslipidemia hypertension- -  CAD - past MI (2001) - stent-2015. 1 Drug Eluting Stent. Taking blood thinners                              Previous cardiac testing     METS/Exercise Tolerance: >4 METS Comment: Walks dog daily 1/4 miles.    Goes up and down a flight of stairs daily without symptoms.   Hematologic: Comments: MGUS      Musculoskeletal: Comment: Thoracic compression fractures.       GI/Hepatic:     (+)         appendicitis (s/p appendectomy),           Renal/Genitourinary:  - neg Renal ROS     Endo:     (+)          thyroid problem, hypothyroidism,    Obesity,    (-) chronic steroid usage   Psychiatric/Substance Use:    (-) psychiatric history, alcohol abuse history and chronic opioid use history   Infectious Disease:  - neg infectious disease ROS     Malignancy: Comment: Ovarian cysts.   (-) malignancy   Other:  - neg other ROS          /71 (BP Location: Right arm, Patient Position: Sitting, Cuff Size: Adult Regular)   Pulse 65   Temp 98  F (36.7  C) (Oral)   Resp 16   Ht 1.613 m (5' 3.5\")   Wt 89.5 kg (197 lb 6.4 oz)   SpO2 95%   BMI 34.42 kg/m      Physical Exam   Constitutional: Awake, alert, cooperative, no apparent distress, and appears stated age.  Eyes: Pupils equal, round and reactive to light, extra ocular muscles intact, sclera clear, conjunctiva normal.  HENT: Normocephalic, oral pharynx with moist mucus membranes, good dentition. No goiter appreciated.   Respiratory: Clear to auscultation bilaterally, no crackles or wheezing.  Cardiovascular: Regular rate and rhythm, normal S1 and S2, and III/VI systolic murmur heard best along RSB.   Carotids +2, no bruits. 1+ pitting edema of LE with L>R. Palpable pulses " to radial  DP and PT arteries.   GI: Normal bowel sounds, soft, non-distended, non-tender, no masses palpated, no hepatosplenomegaly.    Lymph/Hematologic: No cervical lymphadenopathy and no supraclavicular lymphadenopathy.  Skin: Warm and dry.    Musculoskeletal: Limited extension of neck.  There is no redness, warmth, or swelling of the joints. Gross motor strength is normal.    Neurologic: Awake, alert, oriented to name, place and time. Cranial nerves II-XII are grossly intact. Gait is normal.   Neuropsychiatric: Calm, cooperative. Normal affect.     Prior Labs/Diagnostic Studies   All labs and imaging personally reviewed   Last Comprehensive Metabolic Panel:  Lab Results   Component Value Date    CR 0.8 09/18/2023    GFRESTIMATED >60 09/18/2023           PROCEDURES  OBGYN Pelvic/Gyn Ultrasound  8/28/2023  Impression:   Uterus surgically absent.   The right ovary contains several anechoic areas, with a new papillary   projection noted.  No color flow noted.   The left ovary now contains several anechoic areas with now three   papillary projections noted (two within the larger anechoic cyst).   These cysts have changed since her last ultrasound.   No free fluid noted.     Echocardiogram 12/18/2022  Summary    1. Normal sinus rhythm during study.     2. Normal LV size with normal wall thickness. Calculated bi-planes LVEF   65%.    3. Mild RV enlargement with normal systolic function.     4. The IVC measures <2.1 cm with >50% collapse upon inspiration   consistent   with normal right atrial pressure, 3 mmHg.     5. No prior study for comparison.     CT ANGIO CHEST ABD PEL W/WO IV CONT DISSECTION   LOCATION: [facility]   DATE/TIME: 12/17/2022 6:06 PM     INDICATION: Thoracic aortic aneurysm (taa) suspected     IMPRESSION:   1. No pulmonary embolus, aortic dissection, or aneurysm.   2.  Cholelithiasis.   3.  Diverticulosis. No diverticulitis, colitis, or obstruction.   4.  Emphysema.   5.  Coronary artery  disease.     EKG 2022  Sinus Rhythm    NM Cardiac Exercise stress test 2018  Indications:              CORONARY ARTERY DISEASE    ECG Stress Conclusions        The patient exercised for 6 minutes and 0 seconds on a         Manual protocol (5.4 METS).        Normal functional capacity for age.         No chest pain with exercise.         Negative stress ECG for ST segment depression.       Myocardial Perfusion Conclusions        Myocardial perfusion imaging is abnormal.         There is a small-moderate, reversible inferior wall perfusion         defect, concerning for ischemia.        (This inferior wall perfusion defect does improve on prone         imaging, however).        No areas of infarction noted.        Normal LV size and wall motion.         Left ventricular ejection fraction is normal, see above.   The patient's records and results personally reviewed by this provider.     Outside records reviewed from: Care Everywhere    LAB/DIAGNOSTIC STUDIES TODAY:     Latest Reference Range & Units 09/28/23 12:58   Sodium 135 - 145 mmol/L 143   Potassium 3.4 - 5.3 mmol/L 4.6   Chloride 98 - 107 mmol/L 107   Carbon Dioxide (CO2) 22 - 29 mmol/L 26   Urea Nitrogen 8.0 - 23.0 mg/dL 15.3   Creatinine 0.51 - 0.95 mg/dL 0.77   GFR Estimate >60 mL/min/1.73m2 77   Calcium 8.8 - 10.2 mg/dL 9.9   Anion Gap 7 - 15 mmol/L 10   Glucose 70 - 99 mg/dL 97   WBC 4.0 - 11.0 10e3/uL 6.6   Hemoglobin 11.7 - 15.7 g/dL 12.9   Hematocrit 35.0 - 47.0 % 39.0   Platelet Count 150 - 450 10e3/uL 248   RBC Count 3.80 - 5.20 10e6/uL 4.24   MCV 78 - 100 fL 92   MCH 26.5 - 33.0 pg 30.4   MCHC 31.5 - 36.5 g/dL 33.1   RDW 10.0 - 15.0 % 12.4   ABO/Rh(D)  A POS (P)   SPECIMEN EXPIRATION DATE  [id number] (P)   (P): Preliminary    DOPPLER VENOUS ULTRASOUND OF BILATERAL LOWER EXTREMITIES,  9/28/2023 12:36 PM                                                                       IMPRESSION: No evidence of deep venous thrombosis in either  lower  extremity.     I have personally reviewed the examination and initial interpretation  and I agree with the findings.     BREN CORDOVA MD       Assessment    Lindsay Ford is a 82 year old female seen as a PAC referral for risk assessment and optimization for anesthesia.    Plan/Recommendations  Pt will be optimized for the proposed procedure.  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  Will need to be reassessed day of surgery.  Mallampati: III  TM: > 3    - Sinus surgery 2022    CARDIAC  - single vessel coronary artery disease/inferior infarct (2001) status post percutaneous intervention on the right coronary artery (2015), mildly dilated aorta, ischmic cardiomyopathy with recovered LVEF and history of premature ventricular contractions.  ~ follows with Dr. Ferreira (7/13/2023)   ~ cardiac testing above>>elected medical management  ~ ASA, Statin, Zetia, Lopid, metoprolol, losartan and spironolactone    - On exam today, III/VI systolic murmur heard best RSB and 1+ pitting LE edema, b/l (R>L)   ~ LE US for further evaluation see results above  ~ Echocardiogram to evaluate cardiac murmur.    ADDENDUM 10/2/2023  Echocardiogram with two-dimensional, color and spectral Doppler performed.  ______________________________________________________________________________  Interpretation Summary  Global and regional left ventricular function is normal with an EF of 55-60%.  Right ventricular function, chamber size, wall motion, and thickness are  normal.  Mild mitral insufficiency is present.  Trileaflet aortic sclerosis without stenosis.  Mild tricuspid insufficiency is present.  Pulmonary artery systolic pressure is normal.  Ascending  "aorta 4.2 cm.  Mild dilatation of the aorta is present.  The inferior vena cava is normal.  No pericardial effusion is present.  There is no prior study for direct comparison.    Called patient and discussed results.    - METS (Metabolic Equivalents)  Patient performs 4 or more METS exercise without symptoms            Total Score: 0      - RCRI-Low risk: Class 2 0.9% complication rate            Total Score: 1    RCRI: CAD        PULMONARY  - Mild obstructive sleep apnea but prolonged nocturnal hypoxia   JULIANNA with home CPAP.  Patient will be instructed to bring their home CPAP device to the hospital with them.  JULIANNA Low Risk            Total Score: 2    JULIANNA: Hypertension    JULIANNA: Over 50 ys old      - COPD, mild   ~ Well controlled   ~ only becomes symptomatic if she gets a URI   ~ bring inhaler with DOS    - Tobacco History    History   Smoking Status    Former    Types: Cigarettes    Quit date: 1981   Smokeless Tobacco    Never       GI  - PONV:  Significant history. Final decisions regarding prophylaxis by Anesthesia on DOS    PONV High Risk  Total Score: 4           1 AN PONV: Pt is Female    1 AN PONV: Patient is not a current smoker    1 AN PONV: Patient has history of PONV    1 AN PONV: Intended Post Op Opioids        /RENAL  - Baseline Creatinine  see above     ENDOCRINE    - BMI: Estimated body mass index is 34.42 kg/m  as calculated from the following:    Height as of this encounter: 1.613 m (5' 3.5\").    Weight as of this encounter: 89.5 kg (197 lb 6.4 oz).  Obesity (BMI >30)  - Thyroid disorder  Continue home replacement while hospitalized.    HEME  VTE Medium Risk 1.8%            Total Score: 6    VTE: Greater than 59 yrs old    VTE: Current cancer      - Platelet dysfunction second to Aspirin (Filipe, many others)   ~ hold ASA for 7 days prior to surgery     - denies h/o anemia or previous blood transfusion    - MGUS      MSK  - Limited neck extension    - h/o thoracic compression fractures   ~ " consideration for careful positioning      Different anesthesia methods/types have been discussed with the patient, but they are aware that the final plan will be decided by the assigned anesthesia provider on the date of service.  Patient was discussed with Dr. Lake    The patient is optimized for their procedure. AVS with information on surgery time/arrival time, meds and NPO status given by nursing staff. No further diagnostic testing indicated.      On the day of service:     Prep time: 15 minutes  Visit time: 20 minutes  Documentation time: 25 minutes  Coordinating care: 5 minutes  ------------------------------------------  Total time: 65 minutes      DEVON Eid CNP  Preoperative Assessment Center  [facility]  Clinic and Surgery Center  Phone: [phone]  Fax: [phone]     [facility]  Ophthalmolgy  [address], [address]  [place] [address]  Phone: [phone]  Fax:

## 2023-09-28 NOTE — PATIENT INSTRUCTIONS
Preparing for Your Surgery      Name:  Lindsay Ford   MRN:  [id number]   :  1940   Today's Date:  2023       Arriving for surgery:  Surgery date:  10/10/2023  Arrival time:  6:00 am    Please come to:     Please come to:      [facility] [address]  [address] [place]  [address]      The [facility] Patient /Visitor Ramp is located at [address]. Patients and visitors who self-park will receive the reduced hospital parking rate. If the Patient /Visitor Ramp is full, please follow the signs to the  parking located at the main hospital entrance.     parking is available ( 24 hours/ 7 days a week)    Discounted parking pass options are available for patients and visitors. They can be purchased at the [employer] desk at the main hospital entrance.    -    Stop at the security desk and they will direct surgery patients to the 3rd floor Surgery Waiting Room. [phone]     -  If you are in need of directions, wheelchair or escort please stop at the Information/security desk in the lobby.       What can I eat or drink?  -  You may eat and drink normally up to 8 hours prior to arrival time. (Until Midnight)  -  You may have clear liquids until 2 hours prior to arrival time. (Until 4 am)    Examples of clear liquids:  Water  Clear broth  Juices (apple, white grape, white cranberry  and cider) without pulp  Noncarbonated, powder based beverages  (lemonade and Enrique-Aid)  Sodas (Sprite, 7-Up, ginger ale and seltzer)  Coffee or tea (without milk or cream)  Gatorade    -  No Alcohol or cannabis products for at least 24 hours before surgery.     Which medicines can I take?    Hold Aspirin for 7 days before surgery.     Hold Multivitamins for 7 days before surgery.    Hold Supplements for 7 days before surgery. (Fish oil, Glucosamine)    Hold Ibuprofen (Advil, Motrin) for 1 day(s) before surgery--unless otherwise  directed by surgeon.  Hold Naproxen (Aleve) for 4 days before surgery.    -  DO NOT take these medications the day of surgery:  Vitamin B 12  Spironolactone   Vitamin D      -  PLEASE TAKE these medications the day of surgery:  Inhaler per your routine   Docusate  Gemfibrozil   Levothyroxine       How do I prepare myself?  - Please take 2 showers (one the night prior to surgery and one the morning of surgery) using Scrubcare or Hibiclens soap.    Use this soap only from the neck to your toes.     Leave the soap on your skin for one minute--then rinse thoroughly.      You may use your own shampoo and conditioner. No other hair products.   - Please remove all jewelry and body piercings.  - No lotions, deodorants or fragrance.  - No makeup or fingernail polish.   - Bring your ID and insurance card.    -If you have a Deep Brain Stimulator, Spinal Cord Stimulator, or any Neuro Stimulator device---you must bring the remote control to the hospital.      ALL PATIENTS GOING HOME THE SAME DAY OF SURGERY ARE REQUIRED TO HAVE A RESPONSIBLE ADULT TO DRIVE AND BE IN ATTENDANCE WITH THEM FOR 24 HOURS FOLLOWING SURGERY.    Covid testing policy as of 12/06/2022  Your surgeon will notify and schedule you for a COVID test if one is needed before surgery--please direct any questions or COVID symptoms to your surgeon      Questions or Concerns:    - For any questions regarding the day of surgery or your hospital stay, please contact the Pre Admission Nursing Office at [phone].       - If you have health changes between today and your surgery, please call your surgeon.       - For questions after surgery, please call your surgeons office.           Current Visitor Guidelines    You may have 2 visitors in the pre op area.    Visiting hours: 8 a.m. to 8:30 p.m.    You may have four visitors during your inpatient hospital stay.    Patients confirmed or suspected to have symptoms of COVID 19 or flu:     No visitors allowed for adult  patients.   Children (under age 18) can have 1 named visitor.     People who are sick or showing symptoms of COVID 19 or flu:    Are not allowed to visit patients--we can only make exceptions in special situations.       Please follow these guidelines for your visit:          Please maintain social distance          Masking is optional--however at times you may be asked to wear a mask for the safety of yourself and others     Clean your hands with alcohol hand . Do this when you arrive at and leave the building and patient room,    And again after you touch your mask or anything in the room.     Go directly to and from the room you are visiting.     Stay in the patient s room during your visit. Limit going to other places in the hospital as much as possible     Leave bags and jackets at home or in the car.     For everyone s health, please don t come and go during your visit. That includes for smoking   during your visit.

## 2023-10-02 ENCOUNTER — ANCILLARY PROCEDURE (OUTPATIENT)
Dept: CARDIOLOGY | Facility: CLINIC | Age: 83
End: 2023-10-02
Attending: NURSE PRACTITIONER
Payer: COMMERCIAL

## 2023-10-02 LAB — LVEF ECHO: NORMAL

## 2023-10-02 PROCEDURE — 93306 TTE W/DOPPLER COMPLETE: CPT | Performed by: INTERNAL MEDICINE

## 2023-10-10 ENCOUNTER — ANESTHESIA (OUTPATIENT)
Dept: SURGERY | Facility: CLINIC | Age: 83
End: 2023-10-10
Payer: COMMERCIAL

## 2023-10-10 ENCOUNTER — HOSPITAL ENCOUNTER (OUTPATIENT)
Facility: CLINIC | Age: 83
Discharge: HOME | End: 2023-10-10
Attending: OBSTETRICS & GYNECOLOGY | Admitting: OBSTETRICS & GYNECOLOGY
Payer: COMMERCIAL

## 2023-10-10 VITALS
DIASTOLIC BLOOD PRESSURE: 67 MMHG | TEMPERATURE: 96.8 F | RESPIRATION RATE: 16 BRPM | HEART RATE: 77 BPM | SYSTOLIC BLOOD PRESSURE: 118 MMHG | OXYGEN SATURATION: 92 % | HEIGHT: 64 IN | WEIGHT: 195.55 LBS | BODY MASS INDEX: 33.38 KG/M2

## 2023-10-10 DIAGNOSIS — N83.202 BILATERAL OVARIAN CYSTS: ICD-10-CM

## 2023-10-10 DIAGNOSIS — N83.201 BILATERAL OVARIAN CYSTS: ICD-10-CM

## 2023-10-10 PROCEDURE — 250N000009 HC RX 250

## 2023-10-10 PROCEDURE — 710N000010 HC RECOVERY PHASE 1, LEVEL 2, PER MIN: Performed by: OBSTETRICS & GYNECOLOGY

## 2023-10-10 PROCEDURE — 250N000025 HC SEVOFLURANE, PER MIN: Performed by: OBSTETRICS & GYNECOLOGY

## 2023-10-10 PROCEDURE — 250N000011 HC RX IP 250 OP 636: Mod: JZ | Performed by: NURSE ANESTHETIST, CERTIFIED REGISTERED

## 2023-10-10 PROCEDURE — 710N000012 HC RECOVERY PHASE 2, PER MINUTE: Performed by: OBSTETRICS & GYNECOLOGY

## 2023-10-10 PROCEDURE — 250N000011 HC RX IP 250 OP 636: Mod: JZ | Performed by: ANESTHESIOLOGY

## 2023-10-10 PROCEDURE — 88112 CYTOPATH CELL ENHANCE TECH: CPT | Mod: 26 | Performed by: PATHOLOGY

## 2023-10-10 PROCEDURE — 272N000001 HC OR GENERAL SUPPLY STERILE: Performed by: OBSTETRICS & GYNECOLOGY

## 2023-10-10 PROCEDURE — 258N000003 HC RX IP 258 OP 636: Performed by: NURSE ANESTHETIST, CERTIFIED REGISTERED

## 2023-10-10 PROCEDURE — 250N000013 HC RX MED GY IP 250 OP 250 PS 637: Performed by: STUDENT IN AN ORGANIZED HEALTH CARE EDUCATION/TRAINING PROGRAM

## 2023-10-10 PROCEDURE — 258N000003 HC RX IP 258 OP 636: Performed by: ANESTHESIOLOGY

## 2023-10-10 PROCEDURE — 370N000017 HC ANESTHESIA TECHNICAL FEE, PER MIN: Performed by: OBSTETRICS & GYNECOLOGY

## 2023-10-10 PROCEDURE — 88307 TISSUE EXAM BY PATHOLOGIST: CPT | Mod: TC | Performed by: OBSTETRICS & GYNECOLOGY

## 2023-10-10 PROCEDURE — 88307 TISSUE EXAM BY PATHOLOGIST: CPT | Mod: 26 | Performed by: PATHOLOGY

## 2023-10-10 PROCEDURE — 250N000011 HC RX IP 250 OP 636: Mod: JZ | Performed by: OBSTETRICS & GYNECOLOGY

## 2023-10-10 PROCEDURE — 360N000076 HC SURGERY LEVEL 3, PER MIN: Performed by: OBSTETRICS & GYNECOLOGY

## 2023-10-10 PROCEDURE — 250N000011 HC RX IP 250 OP 636: Mod: JZ

## 2023-10-10 PROCEDURE — 250N000011 HC RX IP 250 OP 636: Performed by: OBSTETRICS & GYNECOLOGY

## 2023-10-10 PROCEDURE — 88112 CYTOPATH CELL ENHANCE TECH: CPT | Mod: TC | Performed by: OBSTETRICS & GYNECOLOGY

## 2023-10-10 PROCEDURE — 250N000009 HC RX 250: Performed by: NURSE ANESTHETIST, CERTIFIED REGISTERED

## 2023-10-10 PROCEDURE — 88331 PATH CONSLTJ SURG 1 BLK 1SPC: CPT | Mod: 26 | Performed by: PATHOLOGY

## 2023-10-10 PROCEDURE — 250N000013 HC RX MED GY IP 250 OP 250 PS 637: Performed by: OBSTETRICS & GYNECOLOGY

## 2023-10-10 PROCEDURE — 88331 PATH CONSLTJ SURG 1 BLK 1SPC: CPT | Mod: TC | Performed by: OBSTETRICS & GYNECOLOGY

## 2023-10-10 PROCEDURE — 999N000141 HC STATISTIC PRE-PROCEDURE NURSING ASSESSMENT: Performed by: OBSTETRICS & GYNECOLOGY

## 2023-10-10 RX ORDER — SODIUM CHLORIDE, SODIUM LACTATE, POTASSIUM CHLORIDE, CALCIUM CHLORIDE 600; 310; 30; 20 MG/100ML; MG/100ML; MG/100ML; MG/100ML
INJECTION, SOLUTION INTRAVENOUS CONTINUOUS PRN
Status: DISCONTINUED | OUTPATIENT
Start: 2023-10-10 | End: 2023-10-10

## 2023-10-10 RX ORDER — ONDANSETRON 4 MG/1
4 TABLET, ORALLY DISINTEGRATING ORAL EVERY 30 MIN PRN
Status: DISCONTINUED | OUTPATIENT
Start: 2023-10-10 | End: 2023-10-10 | Stop reason: HOSPADM

## 2023-10-10 RX ORDER — PROPOFOL 10 MG/ML
INJECTION, EMULSION INTRAVENOUS PRN
Status: DISCONTINUED | OUTPATIENT
Start: 2023-10-10 | End: 2023-10-10

## 2023-10-10 RX ORDER — ONDANSETRON 2 MG/ML
4 INJECTION INTRAMUSCULAR; INTRAVENOUS EVERY 30 MIN PRN
Status: DISCONTINUED | OUTPATIENT
Start: 2023-10-10 | End: 2023-10-10 | Stop reason: HOSPADM

## 2023-10-10 RX ORDER — AMOXICILLIN 250 MG
1-2 CAPSULE ORAL 2 TIMES DAILY
Qty: 30 TABLET | Refills: 0 | Status: SHIPPED | OUTPATIENT
Start: 2023-10-10

## 2023-10-10 RX ORDER — FENTANYL CITRATE 50 UG/ML
INJECTION, SOLUTION INTRAMUSCULAR; INTRAVENOUS PRN
Status: DISCONTINUED | OUTPATIENT
Start: 2023-10-10 | End: 2023-10-10

## 2023-10-10 RX ORDER — HYDROMORPHONE HCL IN WATER/PF 6 MG/30 ML
0.2 PATIENT CONTROLLED ANALGESIA SYRINGE INTRAVENOUS EVERY 5 MIN PRN
Status: DISCONTINUED | OUTPATIENT
Start: 2023-10-10 | End: 2023-10-10 | Stop reason: HOSPADM

## 2023-10-10 RX ORDER — HEPARIN SODIUM 5000 [USP'U]/.5ML
5000 INJECTION, SOLUTION INTRAVENOUS; SUBCUTANEOUS
Status: COMPLETED | OUTPATIENT
Start: 2023-10-10 | End: 2023-10-10

## 2023-10-10 RX ORDER — SODIUM CHLORIDE, SODIUM LACTATE, POTASSIUM CHLORIDE, CALCIUM CHLORIDE 600; 310; 30; 20 MG/100ML; MG/100ML; MG/100ML; MG/100ML
INJECTION, SOLUTION INTRAVENOUS CONTINUOUS
Status: DISCONTINUED | OUTPATIENT
Start: 2023-10-10 | End: 2023-10-10 | Stop reason: HOSPADM

## 2023-10-10 RX ORDER — IPRATROPIUM BROMIDE AND ALBUTEROL SULFATE 2.5; .5 MG/3ML; MG/3ML
3 SOLUTION RESPIRATORY (INHALATION) ONCE
Status: COMPLETED | OUTPATIENT
Start: 2023-10-10 | End: 2023-10-10

## 2023-10-10 RX ORDER — CEFAZOLIN SODIUM/WATER 2 G/20 ML
2 SYRINGE (ML) INTRAVENOUS SEE ADMIN INSTRUCTIONS
Status: DISCONTINUED | OUTPATIENT
Start: 2023-10-10 | End: 2023-10-10 | Stop reason: HOSPADM

## 2023-10-10 RX ORDER — IBUPROFEN 600 MG/1
600 TABLET, FILM COATED ORAL ONCE
Status: DISCONTINUED | OUTPATIENT
Start: 2023-10-10 | End: 2023-10-10 | Stop reason: HOSPADM

## 2023-10-10 RX ORDER — ACETAMINOPHEN 325 MG/1
975 TABLET ORAL ONCE
Status: COMPLETED | OUTPATIENT
Start: 2023-10-10 | End: 2023-10-10

## 2023-10-10 RX ORDER — METOPROLOL TARTRATE 1 MG/ML
1-2 INJECTION, SOLUTION INTRAVENOUS EVERY 5 MIN PRN
Status: DISCONTINUED | OUTPATIENT
Start: 2023-10-10 | End: 2023-10-10 | Stop reason: HOSPADM

## 2023-10-10 RX ORDER — FENTANYL CITRATE 50 UG/ML
25 INJECTION, SOLUTION INTRAMUSCULAR; INTRAVENOUS EVERY 5 MIN PRN
Status: DISCONTINUED | OUTPATIENT
Start: 2023-10-10 | End: 2023-10-10 | Stop reason: HOSPADM

## 2023-10-10 RX ORDER — OXYCODONE HYDROCHLORIDE 5 MG/1
5 TABLET ORAL
Status: COMPLETED | OUTPATIENT
Start: 2023-10-10 | End: 2023-10-10

## 2023-10-10 RX ORDER — OXYCODONE HYDROCHLORIDE 5 MG/1
5 TABLET ORAL
Status: DISCONTINUED | OUTPATIENT
Start: 2023-10-10 | End: 2023-10-10 | Stop reason: HOSPADM

## 2023-10-10 RX ORDER — LIDOCAINE HYDROCHLORIDE 20 MG/ML
INJECTION, SOLUTION INFILTRATION; PERINEURAL PRN
Status: DISCONTINUED | OUTPATIENT
Start: 2023-10-10 | End: 2023-10-10

## 2023-10-10 RX ORDER — DEXAMETHASONE SODIUM PHOSPHATE 4 MG/ML
INJECTION, SOLUTION INTRA-ARTICULAR; INTRALESIONAL; INTRAMUSCULAR; INTRAVENOUS; SOFT TISSUE PRN
Status: DISCONTINUED | OUTPATIENT
Start: 2023-10-10 | End: 2023-10-10

## 2023-10-10 RX ORDER — OXYCODONE HYDROCHLORIDE 5 MG/1
5 TABLET ORAL EVERY 6 HOURS PRN
Qty: 6 TABLET | Refills: 0 | Status: SHIPPED | OUTPATIENT
Start: 2023-10-10 | End: 2023-11-01

## 2023-10-10 RX ORDER — PHENAZOPYRIDINE HYDROCHLORIDE 200 MG/1
200 TABLET, FILM COATED ORAL ONCE
Status: COMPLETED | OUTPATIENT
Start: 2023-10-10 | End: 2023-10-10

## 2023-10-10 RX ORDER — METRONIDAZOLE 500 MG/100ML
500 INJECTION, SOLUTION INTRAVENOUS
Status: COMPLETED | OUTPATIENT
Start: 2023-10-10 | End: 2023-10-10

## 2023-10-10 RX ORDER — EPHEDRINE SULFATE 50 MG/ML
INJECTION, SOLUTION INTRAMUSCULAR; INTRAVENOUS; SUBCUTANEOUS PRN
Status: DISCONTINUED | OUTPATIENT
Start: 2023-10-10 | End: 2023-10-10

## 2023-10-10 RX ORDER — CEFAZOLIN SODIUM/WATER 2 G/20 ML
2 SYRINGE (ML) INTRAVENOUS
Status: COMPLETED | OUTPATIENT
Start: 2023-10-10 | End: 2023-10-10

## 2023-10-10 RX ORDER — ACETAMINOPHEN 325 MG/1
650 TABLET ORAL EVERY 6 HOURS PRN
Qty: 24 TABLET | Refills: 0 | Status: SHIPPED | OUTPATIENT
Start: 2023-10-10

## 2023-10-10 RX ORDER — ONDANSETRON 2 MG/ML
INJECTION INTRAMUSCULAR; INTRAVENOUS PRN
Status: DISCONTINUED | OUTPATIENT
Start: 2023-10-10 | End: 2023-10-10

## 2023-10-10 RX ADMIN — SODIUM CHLORIDE, POTASSIUM CHLORIDE, SODIUM LACTATE AND CALCIUM CHLORIDE: 600; 310; 30; 20 INJECTION, SOLUTION INTRAVENOUS at 10:15

## 2023-10-10 RX ADMIN — PHENYLEPHRINE HYDROCHLORIDE 100 MCG: 10 INJECTION INTRAVENOUS at 08:19

## 2023-10-10 RX ADMIN — EPHEDRINE SULFATE 7.5 MG: 5 INJECTION INTRAVENOUS at 08:33

## 2023-10-10 RX ADMIN — FENTANYL CITRATE 50 MCG: 50 INJECTION INTRAMUSCULAR; INTRAVENOUS at 09:49

## 2023-10-10 RX ADMIN — FENTANYL CITRATE 25 MCG: 50 INJECTION, SOLUTION INTRAMUSCULAR; INTRAVENOUS at 10:20

## 2023-10-10 RX ADMIN — HYDROMORPHONE HYDROCHLORIDE 0.2 MG: 0.2 INJECTION, SOLUTION INTRAMUSCULAR; INTRAVENOUS; SUBCUTANEOUS at 10:30

## 2023-10-10 RX ADMIN — FENTANYL CITRATE 25 MCG: 50 INJECTION, SOLUTION INTRAMUSCULAR; INTRAVENOUS at 10:06

## 2023-10-10 RX ADMIN — PHENAZOPYRIDINE 200 MG: 200 TABLET ORAL at 07:16

## 2023-10-10 RX ADMIN — Medication 2 G: at 08:31

## 2023-10-10 RX ADMIN — FENTANYL CITRATE 25 MCG: 50 INJECTION, SOLUTION INTRAMUSCULAR; INTRAVENOUS at 10:25

## 2023-10-10 RX ADMIN — FENTANYL CITRATE 50 MCG: 50 INJECTION INTRAMUSCULAR; INTRAVENOUS at 09:41

## 2023-10-10 RX ADMIN — LIDOCAINE HYDROCHLORIDE 40 MG: 20 INJECTION, SOLUTION INFILTRATION; PERINEURAL at 08:09

## 2023-10-10 RX ADMIN — Medication 50 MG: at 08:09

## 2023-10-10 RX ADMIN — FENTANYL CITRATE 100 MCG: 50 INJECTION INTRAMUSCULAR; INTRAVENOUS at 08:09

## 2023-10-10 RX ADMIN — IPRATROPIUM BROMIDE AND ALBUTEROL SULFATE 3 ML: .5; 3 SOLUTION RESPIRATORY (INHALATION) at 11:22

## 2023-10-10 RX ADMIN — FENTANYL CITRATE 50 MCG: 50 INJECTION INTRAMUSCULAR; INTRAVENOUS at 08:38

## 2023-10-10 RX ADMIN — FENTANYL CITRATE 25 MCG: 50 INJECTION, SOLUTION INTRAMUSCULAR; INTRAVENOUS at 10:15

## 2023-10-10 RX ADMIN — HYDROMORPHONE HYDROCHLORIDE 0.2 MG: 0.2 INJECTION, SOLUTION INTRAMUSCULAR; INTRAVENOUS; SUBCUTANEOUS at 10:35

## 2023-10-10 RX ADMIN — HYDROMORPHONE HYDROCHLORIDE 0.2 MG: 0.2 INJECTION, SOLUTION INTRAMUSCULAR; INTRAVENOUS; SUBCUTANEOUS at 11:00

## 2023-10-10 RX ADMIN — HEPARIN SODIUM 5000 UNITS: 5000 INJECTION, SOLUTION INTRAVENOUS; SUBCUTANEOUS at 07:16

## 2023-10-10 RX ADMIN — OXYCODONE HYDROCHLORIDE 5 MG: 5 TABLET ORAL at 10:45

## 2023-10-10 RX ADMIN — PROCHLORPERAZINE EDISYLATE 5 MG: 5 INJECTION INTRAMUSCULAR; INTRAVENOUS at 12:39

## 2023-10-10 RX ADMIN — SUGAMMADEX 200 MG: 100 INJECTION, SOLUTION INTRAVENOUS at 09:30

## 2023-10-10 RX ADMIN — SODIUM CHLORIDE, POTASSIUM CHLORIDE, SODIUM LACTATE AND CALCIUM CHLORIDE: 600; 310; 30; 20 INJECTION, SOLUTION INTRAVENOUS at 08:38

## 2023-10-10 RX ADMIN — ONDANSETRON 4 MG: 2 INJECTION INTRAMUSCULAR; INTRAVENOUS at 09:10

## 2023-10-10 RX ADMIN — METRONIDAZOLE 500 MG: 500 INJECTION, SOLUTION INTRAVENOUS at 07:03

## 2023-10-10 RX ADMIN — ONDANSETRON 4 MG: 2 INJECTION INTRAMUSCULAR; INTRAVENOUS at 12:24

## 2023-10-10 RX ADMIN — ACETAMINOPHEN 975 MG: 325 TABLET, FILM COATED ORAL at 10:45

## 2023-10-10 RX ADMIN — DEXAMETHASONE SODIUM PHOSPHATE 4 MG: 4 INJECTION, SOLUTION INTRA-ARTICULAR; INTRALESIONAL; INTRAMUSCULAR; INTRAVENOUS; SOFT TISSUE at 09:02

## 2023-10-10 RX ADMIN — Medication 10 MG: at 08:41

## 2023-10-10 RX ADMIN — PROPOFOL 120 MG: 10 INJECTION, EMULSION INTRAVENOUS at 08:09

## 2023-10-10 RX ADMIN — SODIUM CHLORIDE, POTASSIUM CHLORIDE, SODIUM LACTATE AND CALCIUM CHLORIDE: 600; 310; 30; 20 INJECTION, SOLUTION INTRAVENOUS at 08:00

## 2023-10-10 RX ADMIN — HYDROMORPHONE HYDROCHLORIDE 0.2 MG: 0.2 INJECTION, SOLUTION INTRAMUSCULAR; INTRAVENOUS; SUBCUTANEOUS at 10:45

## 2023-10-10 ASSESSMENT — ACTIVITIES OF DAILY LIVING (ADL)
ADLS_ACUITY_SCORE: 35

## 2023-10-10 ASSESSMENT — COPD QUESTIONNAIRES
CAT_SEVERITY: MILD
COPD: 1

## 2023-10-10 ASSESSMENT — ENCOUNTER SYMPTOMS: SEIZURES: 0

## 2023-10-10 ASSESSMENT — LIFESTYLE VARIABLES: TOBACCO_USE: 1

## 2023-10-10 NOTE — ANESTHESIA CARE TRANSFER NOTE
Patient: Lindsay Ford    Procedure: Procedure(s):  Laparoscopic Salpingo-Oophorectomy, Dissect Lymph Node  possible open, possible cancer staging, possible debulking       Diagnosis: Bilateral ovarian cysts [N83.201, N83.202]  Diagnosis Additional Information: No value filed.    Anesthesia Type:   General     Note:    Oropharynx: oropharynx clear of all foreign objects and spontaneously breathing  Level of Consciousness: awake  Oxygen Supplementation: nasal cannula  Level of Supplemental Oxygen (L/min / FiO2): 3  Independent Airway: airway patency satisfactory and stable  Dentition: dentition unchanged  Vital Signs Stable: post-procedure vital signs reviewed and stable  Report to RN Given: handoff report given  Patient transferred to: PACU    Handoff Report: Identifed the Patient, Identified the Reponsible Provider, Reviewed the pertinent medical history, Discussed the surgical course, Reviewed Intra-OP anesthesia mangement and issues during anesthesia, Set expectations for post-procedure period and Allowed opportunity for questions and acknowledgement of understanding      Vitals:  Vitals Value Taken Time   /66 10/10/23 0944   Temp 36.2    Pulse 58 10/10/23 0949   Resp 16 10/10/23 0949   SpO2 93 % 10/10/23 0949   Vitals shown include unvalidated device data.    Electronically Signed By: DEVON Salcido CRNA  October 10, 2023  9:50 AM

## 2023-10-10 NOTE — ANESTHESIA PROCEDURE NOTES
Airway         Procedure Start/Stop Times: 10/10/2023 8:12 AM  Staff -        CRNA: Phil Salazar APRN CRNA       Performed By: CRNAIndications and Patient Condition       Indications for airway management: amanuel-procedural       Induction type:intravenous      Final Airway Details       Final airway type: endotracheal airway       Successful airway: ETT - single  Endotracheal Airway Details        ETT size (mm): 7.0       Cuffed: yes       Successful intubation technique: video laryngoscopy       VL Blade Size: MAC 3       Grade View of Cords: 1       Position: Right       Measured from: lips       Secured at (cm): 21       Bite block used: None    Post intubation assessment        Placement verified by: capnometry and equal breath sounds        Number of attempts at approach: 1       Number of other approaches attempted: 0       Secured with: silk tape       Ease of procedure: easy (very stiff neck)       Dentition: Intact    Medication(s) Administered   Medication Administration Time: 10/10/2023 8:12 AM

## 2023-10-10 NOTE — ANESTHESIA PREPROCEDURE EVALUATION
Anesthesia Pre-Procedure Evaluation    Patient: Lindsay Ford   MRN: [id number] : 1940        Procedure : Procedure(s):  Laparoscopic Salpingo-Oophorectomy, Dissect Lymph Node  possible open, possible cancer staging, possible debulking          Past Medical History:   Diagnosis Date     Complication of anesthesia      PONV (postoperative nausea and vomiting)       Past Surgical History:   Procedure Laterality Date     BRAIN SURGERY       COLONOSCOPY N/A 2023    Procedure: COLONOSCOPY with polypectomies;  Surgeon: Edward Vazquez MD;  Location: [facility] Main OR     HYSTERECTOMY       SINUS SURGERY        No Known Allergies   Social History     Tobacco Use     Smoking status: Former     Types: Cigarettes     Quit date:      Years since quittin.8     Smokeless tobacco: Never   Substance Use Topics     Alcohol use: Yes     Comment: Very rare      Wt Readings from Last 1 Encounters:   10/10/23 88.7 kg (195 lb 8.8 oz)        Anesthesia Evaluation    Type: General and MAC.    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  Did fine with sinus surgery ..    ROS/MED HX  ENT/Pulmonary: Comment: H/o sinus surgery    (+) sleep apnea, uses CPAP,              tobacco use (Quit .  40 pack years.), Past use,       mild,  COPD (Well controlled. 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        (+) Dyslipidemia hypertension- -  CAD - past MI () - stent-. 1 Drug Eluting Stent. Taking blood thinners                              Previous cardiac testing     METS/Exercise Tolerance: >4 METS  Comment: Walks dog daily 1/4 miles.    Goes up and down a flight of stairs daily without symptoms.   Hematologic: Comments: MGUS      Musculoskeletal: Comment: Thoracic compression fractures.       GI/Hepatic:     (+)         appendicitis (s/p appendectomy),        (-) GERD   Renal/Genitourinary:  - neg Renal ROS     Endo:     (+)          thyroid problem, hypothyroidism,    Obesity,    (-) chronic steroid usage   Psychiatric/Substance Use:    (-) psychiatric history, alcohol abuse history and chronic opioid use history   Infectious Disease:  - neg infectious disease ROS     Malignancy: Comment: Ovarian cysts.   (-) malignancy   Other:  - neg other ROS          Physical Exam    Airway        Mallampati: II   TM distance: > 3 FB   Neck ROM: full     Respiratory Devices and Support         Dental       (+) Modest Abnormalities - crowns, retainers, 1 or 2 missing teeth      Cardiovascular          Rhythm and rate: regular and normal   (+) murmur       Pulmonary           breath sounds clear to auscultation       OUTSIDE LABS:  CBC:   Lab Results   Component Value Date    WBC 6.6 09/28/2023    HGB 12.9 09/28/2023    HCT 39.0 09/28/2023     09/28/2023     BMP:   Lab Results   Component Value Date     09/28/2023    POTASSIUM 4.6 09/28/2023    CHLORIDE 107 09/28/2023    CO2 26 09/28/2023    BUN 15.3 09/28/2023    CR 0.77 09/28/2023    CR 0.8 09/18/2023    GLC 97 09/28/2023     COAGS: No results found for: PTT, INR, FIBR  POC: No results found for: BGM, HCG, HCGS  HEPATIC: No results found for: ALBUMIN, PROTTOTAL, ALT, AST, GGT, ALKPHOS, BILITOTAL, BILIDIRECT, STACEY  OTHER:   Lab Results   Component Value Date    BEBA 9.9 09/28/2023       Anesthesia Plan    ASA Status:  3    NPO Status:  NPO Appropriate    Anesthesia Type: General.     - Airway: ETT   Induction: Intravenous, Propofol.   Maintenance: Balanced.   Techniques and Equipment:     - Airway: Video-Laryngoscope     - Lines/Monitors: 2nd IV (prob art line  if goes to open)     Consents    Anesthesia Plan(s) and associated risks, benefits, and realistic alternatives discussed. Questions answered and patient/representative(s) expressed understanding.     - Discussed: Risks, Benefits and Alternatives for the PROCEDURE were discussed     - Discussed with:  Patient      - Extended Intubation/Ventilatory Support Discussed: No.      - Patient is DNR/DNI Status: No     Use of blood products discussed: Yes.     - Discussed with: Patient.     - Consented: consented to blood products            Reason for refusal: other.     Postoperative Care    Pain management: IV analgesics, Peripheral nerve block (Single Shot).   PONV prophylaxis: Ondansetron (or other 5HT-3), Dexamethasone or Solumedrol     Comments:    Other Comments: Patient seen and examined by me and available records reviewed. Details as above.   Patient took metoprolol last evening; no GERD sx.  Walks 1 mile followed by climbing 1 flight stairs without sx/SOB.   Anesthetic options and risks discussed with patient who agrees to proceed.      Samara Mcdaniels MD  10/10/2023  8:00 AM              Samara Mcdaniels MD

## 2023-10-10 NOTE — PROGRESS NOTES
"Gynecologic Oncology   Postoperative Check  10/10/2023    S:   Patient reports she is doing well postoperatively. Pain is well controlled with oral medications. Ambulating without dizziness. Voiding spontaneously. Tolerating water without nausea or vomiting (had some nausea earlier that has now significantly improved). Denies chest pain, shortness of breath, dizziness, or other concerns at this time.    O:  Patient Vitals for the past 24 hrs:   BP Temp Temp src Pulse Resp SpO2 Height Weight   10/10/23 1332 -- -- -- -- -- 92 % -- --   10/10/23 1220 118/67 -- -- 77 16 95 % -- --   10/10/23 1207 124/66 96.8  F (36  C) Axillary 77 18 95 % -- --   10/10/23 1130 123/66 96.8  F (36  C) Axillary 61 14 94 % -- --   10/10/23 1115 124/69 -- -- 66 12 90 % -- --   10/10/23 1100 133/62 -- -- 57 (!) 9 92 % -- --   10/10/23 1045 138/70 (!) 96.3  F (35.7  C) Axillary 54 11 98 % -- --   10/10/23 1030 135/73 -- -- 52 (!) 7 94 % -- --   10/10/23 1020 134/69 -- -- 58 14 (!) 89 % -- --   10/10/23 1015 134/69 (!) 96  F (35.6  C) Axillary 53 (!) 9 100 % -- --   10/10/23 1006 112/64 -- -- 53 (!) 7 95 % -- --   10/10/23 1000 112/64 -- -- 53 (!) 8 97 % -- --   10/10/23 0945 108/66 (!) 96  F (35.6  C) Oral 65 19 95 % -- --   10/10/23 0630 130/81 98.1  F (36.7  C) -- 72 18 94 % -- --   10/10/23 0620 -- -- -- -- -- -- 1.613 m (5' 3.5\") 88.7 kg (195 lb 8.8 oz)     Gen: NAD  Cardio: RRR, S1/S2, no murmurs  Resp: CTAB, no wheezing or crackles  Abdomen: soft, appropriately tender, non distended, incision c/d/i  Extremities: Non-tender, trace edema        A/P:   82 year old POD#0 s/p laparoscopic BSO (benign frozen pathology). Doing well postoperatively. Appropriate for discharge. Reviewed discharge instructions and precautions. She will follow-up post-op with Dr. Barrios on 11/1/2023.    Dz: b/l complex adnexal masses. Ca125 12. Frozen: benign.  FEN: Advance as tolerated  Pain: Tylenol, oxycodone PRN  GI: Stool softeners  : Voiding " spontaneously    Dispo: To home    Grace Oglesby MD, PGY-3  3:57 PM  [facility] Obstetrics & Gynecology Residency

## 2023-10-10 NOTE — OP NOTE
[facility]  Operative Note     Pre-operative diagnosis: Bilateral ovarian cysts [N83.201, N83.202]  Post-operative diagnosis: Same as pre-operative diagnosis     Procedure: Laparoscopic Bilateral Salpingo-Oophorectomy    Surgeon:            Surgeon(s) and Role:     * Everardo Barrios MD - Primary     * Mack Singh MD - Fellow - Assisting        Silvia Cruz MD PGY4 - Assisting     Anesthesia:        General with Block           Estimated Blood Loss: Minimal  IVF: 1000 ml crystalloid  UOP: 400 ml dark yellow urine  Drains:  None    Specimens:            ID Type Source Tests Collected by Time Destination  1 : Pelvic washings Washings Pelvis NON-GYNECOLOGIC CYTOLOGY Everardo Barrios MD 10/10/2023  8:52 AM    2 : right ovary and fallopian tube Tissue Ovary and Fallopian Tube, Right SURGICAL PATHOLOGY EXAM Everardo Barrios MD 10/10/2023  9:03 AM    3 : left ovary fallopian tube Tissue Ovary and Fallopian Tube, Left SURGICAL PATHOLOGY EXAM Everardo Barrios MD 10/10/2023  9:04 AM       Findings:  On laparoscopy, no evidence of injury upon entry. Normal upper abdominal survey. Pelvis with moderate adhesions of colon to left and right pelvic side walls. Surgically absent uterus. Bilateral ovaries with small cysts, otherwise normal appearing. Ureters visualized to vermiculate bilaterally. Hemostatic surgical sites at the end of the case. Frozen pathology demonstrating benign cysts bilaterally.     Complications:   None apparent.  Implants:            * No implants in log *    Indication: Lindsay Ford is a 82 year old female who presented with complex bilateral ovarian cysts that were noted to increase in size along with patient complaints of worsening abdominal bloating. Ca125 was obtained and normal. She was recommended to undergo removal with frozen pathology with the above listed procedure. Risks, benefits, and alternatives were discussed and she consented  to proceed.    Procedure: The patient was taken to the operating room where she was placed in the dorsal lithotomy position with feet in yellow fin stirrups. General endotracheal anesthesia was administered. Patient safety time out was then performed. An exam under anesthesia was performed. The patient was then prepped and draped in the usual sterile fashion. A gray was also placed. Attention was then turned to the abdomen. A 15-blade scalpel was used to make a 10 mm vertical incision in the umbilicus. The Veress needle was introduced and intraabdominal placement confirmed with saline drop test and opening pressure of 2 mmHg. The abdomen was insufflated to a max of 15 mmHg. A 10 mm gelport was placed and CO2 gas was attached to the port. Survey of the abdomen did not reveal any trauma. See above listed findngs. Attention was turned to the LLQ of the abdomen where a site 2 cm medial to the anterior superior iliac crest was noted to be free of major blood vessels and a 5 mm horizontal skin incision made. A 5 mm port was placed under visualization within the abdomen. A 5 mm port was placed in the RLQ of the abdomen with similar technique under visualization. Inspection of the pelvis with blunt probe showed findings as listed above. The right and left ureters were identified and remained outside the operative zone. The right fimbria was grasped with an atraumatic grasper and lifted into view. The Ligasure bipolar grasper was used to sequentially grasp, cauterize, and cut through the infundibulopelvic ligament and along the mesosalpinx, and the tube was then transected and the specimen excised. The left fallopian tube and ovary were excised in a similar fashion. The camera was switched to a 5 mm laparoscope. Both specimens were placed in a 10 mm Endo Catch bag and removed through the umbilical port and handed off for pathology. Mesosalpinx was examined bilaterally and noted to be hemostatic. A final visual sweep of the  pelvis showed no further pathology. Specimens were sent for frozen pathology and found to be benign. The ports were removed under direct visualization, and the peritoneum and fascia of the umbilical 10 mm port site closed in a running stitch with 0-vicryl. The pneumoperitoneum was expelled. The left and right skin incisions were closed using 3-0 monocryl and dermabond. The gray was removed.     Instrument, sponge, and needle counts were correct times 2. The patient was extubated in the operating room and transferred to the PACU in stable condition.      Everardo Barrios MD, MS    Department of Obstetrics and Gynecology   Division of Gynecologic Oncology   [facility]  Phone: [phone]

## 2023-10-10 NOTE — ANESTHESIA POSTPROCEDURE EVALUATION
Patient: Lindsay Ford    Procedure: Procedure(s):  Laparoscopic Salpingo-Oophorectomy       Anesthesia Type:  General    Note:  Disposition: Outpatient   Postop Pain Control: Uneventful            Sign Out: Well controlled pain   PONV: No   Neuro/Psych: Uneventful            Sign Out: Acceptable/Baseline neuro status   Airway/Respiratory: Uneventful            Sign Out: Acceptable/Baseline resp. status   CV/Hemodynamics: Uneventful            Sign Out: Acceptable CV status; No obvious hypovolemia; No obvious fluid overload   Other NRE: NONE   DID A NON-ROUTINE EVENT OCCUR? No    Event details/Postop Comments:  Uneventful PACU stay. HDS. Pain controlled. No N/V. At baseline. Has  at home to stay with her and daughter-in-law to help. Feels comfortable/safe going home.             Last vitals:  Vitals Value Taken Time   /66 10/10/23 1130   Temp 35.7  C (96.3  F) 10/10/23 1045   Pulse 61 10/10/23 1134   Resp 13 10/10/23 1134   SpO2 98 % 10/10/23 1135   Vitals shown include unvalidated device data.    Electronically Signed By: Juvenal Carpio MD  October 10, 2023  11:36 AM

## 2023-10-10 NOTE — DISCHARGE INSTRUCTIONS
[facility]  Same-Day Surgery   Adult Discharge Orders & Instructions     For 24 hours after surgery    Get plenty of rest.  A responsible adult must stay with you for at least 24 hours after you leave the hospital.   Do not drive or use heavy equipment.  If you have weakness or tingling, don't drive or use heavy equipment until this feeling goes away.  Do not drink alcohol.  Avoid strenuous or risky activities.  Ask for help when climbing stairs.   You may feel lightheaded.  IF so, sit for a few minutes before standing.  Have someone help you get up.   If you have nausea (feel sick to your stomach): Drink only clear liquids such as apple juice, ginger ale, broth or 7-Up.  Rest may also help.  Be sure to drink enough fluids.  Move to a regular diet as you feel able.  You may have a slight fever. Call the doctor if your fever is over 100 F (37.7 C) (taken under the tongue) or lasts longer than 24 hours.  You may have a dry mouth, a sore throat, muscle aches or trouble sleeping.  These should go away after 24 hours.  Do not make important or legal decisions.   Call your doctor for any of the followin.  Signs of infection (fever, growing tenderness at the surgery site, a large amount of drainage or bleeding, severe pain, foul-smelling drainage, redness, swelling).    2. It has been over 8 to 10 hours since surgery and you are still not able to urinate (pass water).    3.  Headache for over 24 hours.    4.  Numbness, tingling or weakness the day after surgery (if you had spinal anesthesia).  To contact a doctor, call Dr Barrios at the Gynecology/Oncology clinic at [phone]   or:    '   [phone] and ask for the resident on call for   Gyn/onc (answered 24 hours a day)  '   Emergency Department:    [facility]: [phone]       (TTY for hearing impaired: [phone])    [facility]: [phone]       (TTY for hearing impaired: [phone])

## 2023-10-10 NOTE — BRIEF OP NOTE
[facility]    Brief Operative Note    Pre-operative diagnosis: Bilateral ovarian cysts [N83.201, N83.202]  Post-operative diagnosis Same as pre-operative diagnosis    Procedure: Laparoscopic Salpingo-Oophorectomy, Dissect Lymph Node, N/A - Abdomen  possible open, possible cancer staging, possible debulking, N/A - Abdomen    Surgeon: Surgeon(s) and Role:     * Everardo Barrios MD - Primary     * Mack Singh MD - Fellow - Assisting        Silvia Cruz MD PGY4 - Assisting    Anesthesia: General with Block   Estimated Blood Loss: Minimal  IVF: 1000 ml crystalloid  UOP: 400 ml dark yellow urine    Drains: None  Specimens:   ID Type Source Tests Collected by Time Destination   1 : Pelvic washings Washings Pelvis NON-GYNECOLOGIC CYTOLOGY Everardo Barrios MD 10/10/2023  8:52 AM    2 : right ovary and fallopian tube Tissue Ovary and Fallopian Tube, Right SURGICAL PATHOLOGY EXAM Everardo Barrios MD 10/10/2023  9:03 AM    3 : left ovary fallopian tube Tissue Ovary and Fallopian Tube, Left SURGICAL PATHOLOGY EXAM Everardo Barrios MD 10/10/2023  9:04 AM      Findings:  On laparoscopy, no evidence of injury upon entry. Normal upper abdominal survey. Pelvis with moderate adhesions of colon to left and right pelvic side walls. Surgically absent uterus. Bilateral ovaries with small cysts, otherwise normal appearing. Ureters visualized to vermiculate bilaterally. Hemostatic surgical sites at the end of the case. Frozen pathology demonstrating benign cysts bilaterally.    Complications: None apparent.  Implants: * No implants in log *    Silvia Cruz MD  Gynecologic Oncology, PGY-4  10/10/23 9:38 AM   Team pager: [phone]

## 2023-10-11 LAB
PATH REPORT.COMMENTS IMP SPEC: NORMAL
PATH REPORT.FINAL DX SPEC: NORMAL
PATH REPORT.GROSS SPEC: NORMAL
PATH REPORT.MICROSCOPIC SPEC OTHER STN: NORMAL
PATH REPORT.RELEVANT HX SPEC: NORMAL

## 2023-10-29 LAB
PATH REPORT.COMMENTS IMP SPEC: NORMAL
PATH REPORT.FINAL DX SPEC: NORMAL
PATH REPORT.GROSS SPEC: NORMAL
PATH REPORT.INTRAOP OBS SPEC DOC: NORMAL
PATH REPORT.MICROSCOPIC SPEC OTHER STN: NORMAL
PATH REPORT.RELEVANT HX SPEC: NORMAL
PHOTO IMAGE: NORMAL

## 2023-11-01 ENCOUNTER — ONCOLOGY VISIT (OUTPATIENT)
Dept: ONCOLOGY | Facility: CLINIC | Age: 83
End: 2023-11-01
Attending: OBSTETRICS & GYNECOLOGY
Payer: COMMERCIAL

## 2023-11-01 VITALS
TEMPERATURE: 97.9 F | DIASTOLIC BLOOD PRESSURE: 78 MMHG | OXYGEN SATURATION: 96 % | BODY MASS INDEX: 33.1 KG/M2 | RESPIRATION RATE: 16 BRPM | WEIGHT: 193.9 LBS | HEART RATE: 54 BPM | HEIGHT: 64 IN | SYSTOLIC BLOOD PRESSURE: 125 MMHG

## 2023-11-01 DIAGNOSIS — D27.0 FIBROMA OF RIGHT OVARY: Primary | ICD-10-CM

## 2023-11-01 PROCEDURE — G0463 HOSPITAL OUTPT CLINIC VISIT: HCPCS | Performed by: OBSTETRICS & GYNECOLOGY

## 2023-11-01 PROCEDURE — 99214 OFFICE O/P EST MOD 30 MIN: CPT | Performed by: OBSTETRICS & GYNECOLOGY

## 2023-11-01 ASSESSMENT — PAIN SCALES - GENERAL: PAINLEVEL: MILD PAIN (2)

## 2023-11-01 NOTE — NURSING NOTE
"Oncology Rooming Note    November 1, 2023 9:12 AM   Lindsay Ford is a 82 year old female who presents for:    Chief Complaint   Patient presents with    Oncology Clinic Visit     [facility] RETURN - SALPINGO OOPHORECTOMY      Initial Vitals: /78 (BP Location: Right arm, Patient Position: Chair, Cuff Size: Adult Large)   Pulse 54   Temp 97.9  F (36.6  C) (Oral)   Resp 16   Ht 1.613 m (5' 3.5\")   Wt 88 kg (193 lb 14.4 oz)   SpO2 96%   BMI 33.80 kg/m   Estimated body mass index is 33.8 kg/m  as calculated from the following:    Height as of this encounter: 1.613 m (5' 3.5\").    Weight as of this encounter: 88 kg (193 lb 14.4 oz). Body surface area is 1.99 meters squared.  Mild Pain (2) Comment: Data Unavailable   No LMP recorded. Patient has had a hysterectomy.  Allergies reviewed: Yes  Medications reviewed: Yes    Medications: Medication refills not needed today.  Pharmacy name entered into [facility]: [id number] - [place], [place] - [address] AT [facility] [address]              "

## 2023-11-01 NOTE — PATIENT INSTRUCTIONS
No Gyn/Onc follow up needed     Let us know if any questions or concerns arise    Have a beautiful day    [place]

## 2023-11-01 NOTE — PROGRESS NOTES
Follow Up Notes on Referred Patient    Date: 2023       Dr. Martha Miguel MD  No address on file       RE: Lindsay Ford  : 1940  PABLO: 2023    Dear Dr. Martha Miguel:    Lindsay Ford is a 82 year old woman with a diagnosis of bilateral ovarian fibromas.  She presents today for follow-up after surgery.  She has been doing well she is eating and drinking well no nausea vomiting fever chills.  Normal urinary and bowel function.  No vaginal bleeding or discharge.  No B symptoms.      Past Medical History:    Past Medical History:   Diagnosis Date    Complication of anesthesia     PONV (postoperative nausea and vomiting)          Past Surgical History:    Past Surgical History:   Procedure Laterality Date    BRAIN SURGERY      COLONOSCOPY N/A 2023    Procedure: COLONOSCOPY with polypectomies;  Surgeon: Edward Vazquez MD;  Location: [facility] OR    HYSTERECTOMY      LAPAROSCOPIC SALPINGO-OOPHORECTOMY, DISSECT LYMPH NODE N/A 10/10/2023    Procedure: Laparoscopic Salpingo-Oophorectomy;  Surgeon: Everardo Barrios MD;  Location: [facility] OR    SINUS SURGERY           Health Maintenance Due   Topic Date Due    DEXA  Never done    TSH W/FREE T4 REFLEX  Never done    ADVANCE CARE PLANNING  Never done    RSV VACCINE 60+ (1 - 1-dose 60+ series) Never done    MEDICARE ANNUAL WELLNESS VISIT  2011    COVID-19 Vaccine ( season) 2023    DTAP/TDAP/TD IMMUNIZATION (2 - Td or Tdap) 10/01/2023       Current Medications:     Current Outpatient Medications   Medication Sig Dispense Refill    acetaminophen (TYLENOL) 325 MG tablet Take 2 tablets (650 mg) by mouth every 6 hours as needed for mild pain 24 tablet 0    albuterol (PROAIR HFA/PROVENTIL HFA/VENTOLIN HFA) 108 (90 Base) MCG/ACT inhaler Inhale 2 puffs into the lungs every 6 hours as needed for shortness of breath, wheezing or cough      Ascorbic Acid (VITAMIN C) 500 MG CAPS Take 500 mg by mouth every evening      aspirin  81 MG EC tablet Take 162 mg by mouth every evening      atorvastatin (LIPITOR) 40 MG tablet Take 40 mg by mouth every evening      cyanocobalamin (VITAMIN B-12) 1000 MCG tablet Take 1,000 mcg by mouth every morning      docusate sodium (COLACE) 100 MG capsule Take 100 mg by mouth 2 times daily      ezetimibe (ZETIA) 10 MG tablet Take 10 mg by mouth every evening      fish oil-omega-3 fatty acids 1000 MG capsule Take 2 g by mouth every evening      fluticasone (FLOVENT HFA) 110 MCG/ACT inhaler Inhale 1 puff into the lungs as needed      gemfibrozil (LOPID) 600 MG tablet Take 600 mg by mouth 2 times daily (before meals)      glucosamine-chondroitin 500-400 MG CAPS per capsule Take 1 capsule by mouth every morning      levothyroxine (SYNTHROID/LEVOTHROID) 125 MCG tablet Take 125 mcg by mouth every morning      losartan (COZAAR) 25 MG tablet Take 25 mg by mouth every evening      methylcellulose (CITRUCEL) 500 MG TABS tablet Take 2,000 mg by mouth every evening      metoprolol tartrate (LOPRESSOR) 50 MG tablet Take 50 mg by mouth every evening      multivitamin w/minerals (MULTI-VITAMIN) tablet Take 1 tablet by mouth every evening      nitroGLYcerin (NITROSTAT) 0.4 MG sublingual tablet Place 0.4 mg under the tongue every 5 minutes as needed for chest pain For chest pain place 1 tablet under the tongue every 5 minutes for 3 doses. If symptoms persist 5 minutes after 1st dose call 911.      senna-docusate (SENOKOT-S/PERICOLACE) 8.6-50 MG tablet Take 1-2 tablets by mouth 2 times daily 30 tablet 0    spironolactone (ALDACTONE) 25 MG tablet Take 25 mg by mouth every morning      vitamin D3 (CHOLECALCIFEROL) 50 mcg (2000 units) tablet Take 1 tablet by mouth every morning           Allergies:      No Known Allergies     Social History:     Social History     Tobacco Use    Smoking status: Former     Types: Cigarettes     Quit date:      Years since quittin.8    Smokeless tobacco: Never   Substance Use Topics     "Alcohol use: Yes     Comment: Very rare       History   Drug Use Unknown         Physical Exam:     /78 (BP Location: Right arm, Patient Position: Chair, Cuff Size: Adult Large)   Pulse 54   Temp 97.9  F (36.6  C) (Oral)   Resp 16   Ht 1.613 m (5' 3.5\")   Wt 88 kg (193 lb 14.4 oz)   SpO2 96%   BMI 33.80 kg/m    Body mass index is 33.8 kg/m .    General Appearance: healthy and alert, no distress     Musculoskeletal: extremities non tender and without edema    Skin: no lesions or rashes     Neurological: normal gait, no gross defects     Psychiatric: appropriate mood and affect                               Hematological: normal cervical, supraclavicular and inguinal lymph nodes     Gastrointestinal:       abdomen soft, non-tender, non-distended, no organomegaly or masses, well-healing port site incisions.  A 3-0 Monocryl suture was trimmed on the right lower port site.      Assessment:    Lindsay Ford is a 82 year old woman with a diagnosis of bilateral ovarian fibromas.     A total of 30 minutes was spent with the patient, 25 minutes of which were spent in counseling the patient and/or treatment planning.      Bilateral ovarian fibromas    We discussed the benign pathology.  She is surgically dismissed and does not need any additional follow-up with us.  I still recommend she sees a general provider or gynecologist once a year for pelvic exams.  The patient agrees with this plan she is very appreciative for care all questions were answered.    Everardo Barrios MD, MS    Department of Obstetrics and Gynecology   Division of Gynecologic Oncology   [facility]  Phone: [phone]        CC  Patient Care Team:  Luna Stokes MD as PCP - General  Humaira Plascencia MD as MD (OB/Gyn)  Everardo Barrios MD as MD (Gynecologic Oncology)  Everardo Barrios MD as Assigned Cancer Care Provider  SELF, REFERRED    "

## 2023-11-01 NOTE — LETTER
2023         RE: Lindsay Ford  [address] [facility] [address]        Dear Colleague,    Thank you for referring your patient, Lindsay Ford, to the [facility]. Please see a copy of my visit note below.                Follow Up Notes on Referred Patient    Date: 2023       Dr. Martha Miguel MD  No address on file       RE: Lindsay Ford  : 1940  PABLO: 2023    Dear Dr. Martha Miguel:    Lindsay Ford is a 82 year old woman with a diagnosis of bilateral ovarian fibromas.  She presents today for follow-up after surgery.  She has been doing well she is eating and drinking well no nausea vomiting fever chills.  Normal urinary and bowel function.  No vaginal bleeding or discharge.  No B symptoms.      Past Medical History:    Past Medical History:   Diagnosis Date     Complication of anesthesia      PONV (postoperative nausea and vomiting)          Past Surgical History:    Past Surgical History:   Procedure Laterality Date     BRAIN SURGERY       COLONOSCOPY N/A 2023    Procedure: COLONOSCOPY with polypectomies;  Surgeon: Edward Vazquez MD;  Location: [facility] OR     HYSTERECTOMY       LAPAROSCOPIC SALPINGO-OOPHORECTOMY, DISSECT LYMPH NODE N/A 10/10/2023    Procedure: Laparoscopic Salpingo-Oophorectomy;  Surgeon: Everardo Barrios MD;  Location: [facility] OR     SINUS SURGERY           Health Maintenance Due   Topic Date Due     DEXA  Never done     TSH W/FREE T4 REFLEX  Never done     ADVANCE CARE PLANNING  Never done     RSV VACCINE 60+ (1 - 1-dose 60+ series) Never done     MEDICARE ANNUAL WELLNESS VISIT  2011     COVID-19 Vaccine ( season) 2023     DTAP/TDAP/TD IMMUNIZATION (2 - Td or Tdap) 10/01/2023       Current Medications:     Current Outpatient Medications   Medication Sig Dispense Refill     acetaminophen (TYLENOL) 325 MG tablet Take 2 tablets (650 mg) by mouth every 6 hours as needed for mild pain 24 tablet 0      albuterol (PROAIR HFA/PROVENTIL HFA/VENTOLIN HFA) 108 (90 Base) MCG/ACT inhaler Inhale 2 puffs into the lungs every 6 hours as needed for shortness of breath, wheezing or cough       Ascorbic Acid (VITAMIN C) 500 MG CAPS Take 500 mg by mouth every evening       aspirin 81 MG EC tablet Take 162 mg by mouth every evening       atorvastatin (LIPITOR) 40 MG tablet Take 40 mg by mouth every evening       cyanocobalamin (VITAMIN B-12) 1000 MCG tablet Take 1,000 mcg by mouth every morning       docusate sodium (COLACE) 100 MG capsule Take 100 mg by mouth 2 times daily       ezetimibe (ZETIA) 10 MG tablet Take 10 mg by mouth every evening       fish oil-omega-3 fatty acids 1000 MG capsule Take 2 g by mouth every evening       fluticasone (FLOVENT HFA) 110 MCG/ACT inhaler Inhale 1 puff into the lungs as needed       gemfibrozil (LOPID) 600 MG tablet Take 600 mg by mouth 2 times daily (before meals)       glucosamine-chondroitin 500-400 MG CAPS per capsule Take 1 capsule by mouth every morning       levothyroxine (SYNTHROID/LEVOTHROID) 125 MCG tablet Take 125 mcg by mouth every morning       losartan (COZAAR) 25 MG tablet Take 25 mg by mouth every evening       methylcellulose (CITRUCEL) 500 MG TABS tablet Take 2,000 mg by mouth every evening       metoprolol tartrate (LOPRESSOR) 50 MG tablet Take 50 mg by mouth every evening       multivitamin w/minerals (MULTI-VITAMIN) tablet Take 1 tablet by mouth every evening       nitroGLYcerin (NITROSTAT) 0.4 MG sublingual tablet Place 0.4 mg under the tongue every 5 minutes as needed for chest pain For chest pain place 1 tablet under the tongue every 5 minutes for 3 doses. If symptoms persist 5 minutes after 1st dose call 911.       senna-docusate (SENOKOT-S/PERICOLACE) 8.6-50 MG tablet Take 1-2 tablets by mouth 2 times daily 30 tablet 0     spironolactone (ALDACTONE) 25 MG tablet Take 25 mg by mouth every morning       vitamin D3 (CHOLECALCIFEROL) 50 mcg (2000 units) tablet Take 1  "tablet by mouth every morning           Allergies:      No Known Allergies     Social History:     Social History     Tobacco Use     Smoking status: Former     Types: Cigarettes     Quit date:      Years since quittin.8     Smokeless tobacco: Never   Substance Use Topics     Alcohol use: Yes     Comment: Very rare       History   Drug Use Unknown         Physical Exam:     /78 (BP Location: Right arm, Patient Position: Chair, Cuff Size: Adult Large)   Pulse 54   Temp 97.9  F (36.6  C) (Oral)   Resp 16   Ht 1.613 m (5' 3.5\")   Wt 88 kg (193 lb 14.4 oz)   SpO2 96%   BMI 33.80 kg/m    Body mass index is 33.8 kg/m .    General Appearance: healthy and alert, no distress     Musculoskeletal: extremities non tender and without edema    Skin: no lesions or rashes     Neurological: normal gait, no gross defects     Psychiatric: appropriate mood and affect                               Hematological: normal cervical, supraclavicular and inguinal lymph nodes     Gastrointestinal:       abdomen soft, non-tender, non-distended, no organomegaly or masses, well-healing port site incisions.  A 3-0 Monocryl suture was trimmed on the right lower port site.      Assessment:    Lindsay Ford is a 82 year old woman with a diagnosis of bilateral ovarian fibromas.     A total of 30 minutes was spent with the patient, 25 minutes of which were spent in counseling the patient and/or treatment planning.      Bilateral ovarian fibromas    We discussed the benign pathology.  She is surgically dismissed and does not need any additional follow-up with us.  I still recommend she sees a general provider or gynecologist once a year for pelvic exams.  The patient agrees with this plan she is very appreciative for care all questions were answered.    Everardo Barrios MD, MS    Department of Obstetrics and Gynecology   Division of Gynecologic Oncology   [facility]  Phone: " [phone]          Patient Care Team:  Luna Stokes MD as PCP - General  Humaira Plascencia MD as MD (OB/Gyn)  Everardo Barrios MD as MD (Gynecologic Oncology)  Everardo Barrios MD as Assigned Cancer Care Provider  SELF, REFERRED

## 2023-11-18 ENCOUNTER — HEALTH MAINTENANCE LETTER (OUTPATIENT)
Age: 83
End: 2023-11-18

## 2024-12-29 ENCOUNTER — HEALTH MAINTENANCE LETTER (OUTPATIENT)
Age: 84
End: 2024-12-29

## (undated) DEVICE — SUCTION IRR STRYKERFLOW II W/TIP 250-070-520

## (undated) DEVICE — JELLY LUBRICATING SURGILUBE 2OZ TUBE

## (undated) DEVICE — ENDO TROCAR FIRST ENTRY KII FIOS ADV FIX 05X100MM CFF03

## (undated) DEVICE — ESU LIGASURE LAPAROSCOPIC BLUNT TIP SEALER 5MMX44CM LF1844

## (undated) DEVICE — SU VICRYL 0 UR-6 27" J603H

## (undated) DEVICE — WIPES FOLEY CARE SURESTEP PROVON DFC100

## (undated) DEVICE — SUCTION MANIFOLD NEPTUNE 2 SYS 1 PORT 702-025-000

## (undated) DEVICE — ESU ENDO SCISSORS 5MM CVD 5DCS

## (undated) DEVICE — SU DERMABOND ADVANCED .7ML DNX12

## (undated) DEVICE — PROTECTOR ARM ONE-STEP TRENDELENBURG 40418

## (undated) DEVICE — LINEN TOWEL PACK X6 WHITE 5487

## (undated) DEVICE — PREP CHLORAPREP 26ML TINTED HI-LITE ORANGE 930815

## (undated) DEVICE — SOL WATER IRRIG 1000ML BOTTLE 2F7114

## (undated) DEVICE — LINEN TOWEL PACK X30 5481

## (undated) DEVICE — PREP SCRUB SOL EXIDINE 4% CHG 4OZ 29002-404

## (undated) DEVICE — GLOVE BIOGEL PI MICRO SZ 7.5 48575

## (undated) DEVICE — ENDO TROCAR FIRST ENTRY KII FIOS ADV FIX 12X100MM CFF73

## (undated) DEVICE — ENDO TROCAR SLEEVE KII ADV FIXATION 05X100MM CFS02

## (undated) DEVICE — ESU GROUND PAD ADULT W/CORD E7507

## (undated) DEVICE — ENDO SCOPE WARMER SEAL  C3101

## (undated) DEVICE — DEVICE SUTURE PASSER 14GA WECK EFX EFXSP2

## (undated) DEVICE — SUCTION MANIFOLD NEPTUNE 2 SYS 4 PORT 0702-020-000

## (undated) DEVICE — ENDO SNARE EXACTO COLD 9MM LOOP 2.4MMX230CM 00711115

## (undated) DEVICE — NDL INSUFFLATION 13GA 120MM C2201

## (undated) DEVICE — SU VICRYL 0 TIE 54" J608H

## (undated) DEVICE — Device

## (undated) DEVICE — SOL NACL 0.9% INJ 1000ML BAG 2B1324X

## (undated) DEVICE — ENDO POUCH UNIVERSAL RETRIEVAL SYSTEM INZII 5MM CD003

## (undated) DEVICE — SU MONOCRYL 3-0 PS-1 27" Y936H

## (undated) DEVICE — TUBING SMOKE EVAC PNEUMOCLEAR HIGH FLOW 0620050250

## (undated) DEVICE — GLOVE BIOGEL PI MICRO INDICATOR UNDERGLOVE SZ 8.0 48980

## (undated) DEVICE — SPECIMEN TRAP MUCOUS 40ML LUKI C30200A

## (undated) DEVICE — TUBING SUCTION MEDI-VAC 1/4"X20' N620A - HE

## (undated) DEVICE — KIT PATIENT POSITIONING PIGAZZI LATEX FREE 40580

## (undated) RX ORDER — HYDROMORPHONE HCL IN WATER/PF 6 MG/30 ML
PATIENT CONTROLLED ANALGESIA SYRINGE INTRAVENOUS
Status: DISPENSED
Start: 2023-10-10

## (undated) RX ORDER — FENTANYL CITRATE 50 UG/ML
INJECTION, SOLUTION INTRAMUSCULAR; INTRAVENOUS
Status: DISPENSED
Start: 2023-10-10

## (undated) RX ORDER — IPRATROPIUM BROMIDE AND ALBUTEROL SULFATE 2.5; .5 MG/3ML; MG/3ML
SOLUTION RESPIRATORY (INHALATION)
Status: DISPENSED
Start: 2023-10-10

## (undated) RX ORDER — CEFAZOLIN SODIUM/WATER 2 G/20 ML
SYRINGE (ML) INTRAVENOUS
Status: DISPENSED
Start: 2023-10-10

## (undated) RX ORDER — OXYCODONE HYDROCHLORIDE 5 MG/1
TABLET ORAL
Status: DISPENSED
Start: 2023-10-10

## (undated) RX ORDER — METRONIDAZOLE 500 MG/100ML
INJECTION, SOLUTION INTRAVENOUS
Status: DISPENSED
Start: 2023-10-10

## (undated) RX ORDER — ONDANSETRON 2 MG/ML
INJECTION INTRAMUSCULAR; INTRAVENOUS
Status: DISPENSED
Start: 2023-10-10

## (undated) RX ORDER — ACETAMINOPHEN 325 MG/1
TABLET ORAL
Status: DISPENSED
Start: 2023-10-10

## (undated) RX ORDER — EPHEDRINE SULFATE 50 MG/ML
INJECTION, SOLUTION INTRAMUSCULAR; INTRAVENOUS; SUBCUTANEOUS
Status: DISPENSED
Start: 2023-10-10

## (undated) RX ORDER — PROPOFOL 10 MG/ML
INJECTION, EMULSION INTRAVENOUS
Status: DISPENSED
Start: 2023-10-10

## (undated) RX ORDER — HEPARIN SODIUM 5000 [USP'U]/.5ML
INJECTION, SOLUTION INTRAVENOUS; SUBCUTANEOUS
Status: DISPENSED
Start: 2023-10-10

## (undated) RX ORDER — PHENAZOPYRIDINE HYDROCHLORIDE 200 MG/1
TABLET, FILM COATED ORAL
Status: DISPENSED
Start: 2023-10-10